# Patient Record
Sex: FEMALE | Race: BLACK OR AFRICAN AMERICAN | Employment: OTHER | URBAN - NONMETROPOLITAN AREA
[De-identification: names, ages, dates, MRNs, and addresses within clinical notes are randomized per-mention and may not be internally consistent; named-entity substitution may affect disease eponyms.]

---

## 2021-07-08 ENCOUNTER — HOSPITAL ENCOUNTER (INPATIENT)
Age: 46
LOS: 5 days | Discharge: HOME | DRG: 885 | End: 2021-07-13
Attending: EMERGENCY MEDICINE | Admitting: PSYCHIATRY & NEUROLOGY
Payer: MEDICAID

## 2021-07-08 DIAGNOSIS — F19.11 HISTORY OF DRUG ABUSE (HCC): ICD-10-CM

## 2021-07-08 DIAGNOSIS — M54.50 ACUTE EXACERBATION OF CHRONIC LOW BACK PAIN: ICD-10-CM

## 2021-07-08 DIAGNOSIS — R45.851 DEPRESSION WITH SUICIDAL IDEATION: Primary | ICD-10-CM

## 2021-07-08 DIAGNOSIS — G89.29 ACUTE EXACERBATION OF CHRONIC LOW BACK PAIN: ICD-10-CM

## 2021-07-08 DIAGNOSIS — F32.A DEPRESSION WITH SUICIDAL IDEATION: Primary | ICD-10-CM

## 2021-07-08 LAB
ACETAMINOPHEN LEVEL: <15 UG/ML
ALBUMIN SERPL-MCNC: 4.4 G/DL (ref 3.5–5.2)
ALP BLD-CCNC: 63 U/L (ref 35–104)
ALT SERPL-CCNC: 20 U/L (ref 5–33)
AMPHETAMINE SCREEN, URINE: NEGATIVE
ANION GAP SERPL CALCULATED.3IONS-SCNC: 9 MMOL/L (ref 7–19)
AST SERPL-CCNC: 23 U/L (ref 5–32)
BARBITURATE SCREEN URINE: NEGATIVE
BASOPHILS ABSOLUTE: 0.1 K/UL (ref 0–0.2)
BASOPHILS RELATIVE PERCENT: 0.5 % (ref 0–1)
BENZODIAZEPINE SCREEN, URINE: NEGATIVE
BILIRUB SERPL-MCNC: <0.2 MG/DL (ref 0.2–1.2)
BUN BLDV-MCNC: 12 MG/DL (ref 6–20)
CALCIUM SERPL-MCNC: 10.1 MG/DL (ref 8.6–10)
CANNABINOID SCREEN URINE: NEGATIVE
CHLORIDE BLD-SCNC: 98 MMOL/L (ref 98–111)
CO2: 28 MMOL/L (ref 22–29)
COCAINE METABOLITE SCREEN URINE: NEGATIVE
CREAT SERPL-MCNC: 0.6 MG/DL (ref 0.5–0.9)
EOSINOPHILS ABSOLUTE: 0.3 K/UL (ref 0–0.6)
EOSINOPHILS RELATIVE PERCENT: 3.5 % (ref 0–5)
ETHANOL: <10 MG/DL (ref 0–0.08)
GFR AFRICAN AMERICAN: >59
GFR NON-AFRICAN AMERICAN: >60
GLUCOSE BLD-MCNC: 121 MG/DL (ref 74–109)
HCG QUALITATIVE: NEGATIVE
HCT VFR BLD CALC: 35.3 % (ref 37–47)
HEMOGLOBIN: 11.5 G/DL (ref 12–16)
IMMATURE GRANULOCYTES #: 0 K/UL
LYMPHOCYTES ABSOLUTE: 2.4 K/UL (ref 1.1–4.5)
LYMPHOCYTES RELATIVE PERCENT: 24.9 % (ref 20–40)
Lab: NORMAL
MCH RBC QN AUTO: 22.6 PG (ref 27–31)
MCHC RBC AUTO-ENTMCNC: 32.6 G/DL (ref 33–37)
MCV RBC AUTO: 69.4 FL (ref 81–99)
MONOCYTES ABSOLUTE: 0.9 K/UL (ref 0–0.9)
MONOCYTES RELATIVE PERCENT: 8.8 % (ref 0–10)
NEUTROPHILS ABSOLUTE: 6.1 K/UL (ref 1.5–7.5)
NEUTROPHILS RELATIVE PERCENT: 62 % (ref 50–65)
OPIATE SCREEN URINE: NEGATIVE
PDW BLD-RTO: 15 % (ref 11.5–14.5)
PLATELET # BLD: 545 K/UL (ref 130–400)
PMV BLD AUTO: 8.9 FL (ref 9.4–12.3)
POTASSIUM SERPL-SCNC: 3.8 MMOL/L (ref 3.5–5)
RBC # BLD: 5.09 M/UL (ref 4.2–5.4)
SALICYLATE, SERUM: <3 MG/DL (ref 3–10)
SARS-COV-2, NAAT: NOT DETECTED
SODIUM BLD-SCNC: 135 MMOL/L (ref 136–145)
TOTAL PROTEIN: 7.8 G/DL (ref 6.6–8.7)
WBC # BLD: 9.8 K/UL (ref 4.8–10.8)

## 2021-07-08 PROCEDURE — 80143 DRUG ASSAY ACETAMINOPHEN: CPT

## 2021-07-08 PROCEDURE — 80307 DRUG TEST PRSMV CHEM ANLYZR: CPT

## 2021-07-08 PROCEDURE — 80179 DRUG ASSAY SALICYLATE: CPT

## 2021-07-08 PROCEDURE — 82077 ASSAY SPEC XCP UR&BREATH IA: CPT

## 2021-07-08 PROCEDURE — 84703 CHORIONIC GONADOTROPIN ASSAY: CPT

## 2021-07-08 PROCEDURE — 6370000000 HC RX 637 (ALT 250 FOR IP): Performed by: PSYCHIATRY & NEUROLOGY

## 2021-07-08 PROCEDURE — 36415 COLL VENOUS BLD VENIPUNCTURE: CPT

## 2021-07-08 PROCEDURE — 6370000000 HC RX 637 (ALT 250 FOR IP)

## 2021-07-08 PROCEDURE — 87635 SARS-COV-2 COVID-19 AMP PRB: CPT

## 2021-07-08 PROCEDURE — 96372 THER/PROPH/DIAG INJ SC/IM: CPT

## 2021-07-08 PROCEDURE — 85025 COMPLETE CBC W/AUTO DIFF WBC: CPT

## 2021-07-08 PROCEDURE — 99284 EMERGENCY DEPT VISIT MOD MDM: CPT

## 2021-07-08 PROCEDURE — 6360000002 HC RX W HCPCS: Performed by: EMERGENCY MEDICINE

## 2021-07-08 PROCEDURE — 80053 COMPREHEN METABOLIC PANEL: CPT

## 2021-07-08 PROCEDURE — 1240000000 HC EMOTIONAL WELLNESS R&B

## 2021-07-08 RX ORDER — TRAZODONE HYDROCHLORIDE 50 MG/1
50 TABLET ORAL NIGHTLY PRN
Status: DISCONTINUED | OUTPATIENT
Start: 2021-07-08 | End: 2021-07-13 | Stop reason: HOSPADM

## 2021-07-08 RX ORDER — POLYETHYLENE GLYCOL 3350 17 G/17G
17 POWDER, FOR SOLUTION ORAL DAILY PRN
Status: DISCONTINUED | OUTPATIENT
Start: 2021-07-08 | End: 2021-07-13 | Stop reason: HOSPADM

## 2021-07-08 RX ORDER — CHOLECALCIFEROL (VITAMIN D3) 125 MCG
5 CAPSULE ORAL NIGHTLY PRN
Status: DISCONTINUED | OUTPATIENT
Start: 2021-07-08 | End: 2021-07-08 | Stop reason: SDUPTHER

## 2021-07-08 RX ORDER — ORPHENADRINE CITRATE 30 MG/ML
60 INJECTION INTRAMUSCULAR; INTRAVENOUS ONCE
Status: COMPLETED | OUTPATIENT
Start: 2021-07-08 | End: 2021-07-08

## 2021-07-08 RX ORDER — MECOBALAMIN 5000 MCG
5 TABLET,DISINTEGRATING ORAL NIGHTLY PRN
Status: DISCONTINUED | OUTPATIENT
Start: 2021-07-08 | End: 2021-07-13 | Stop reason: HOSPADM

## 2021-07-08 RX ORDER — ACETAMINOPHEN 325 MG/1
650 TABLET ORAL EVERY 4 HOURS PRN
Status: DISCONTINUED | OUTPATIENT
Start: 2021-07-08 | End: 2021-07-13 | Stop reason: HOSPADM

## 2021-07-08 RX ORDER — ROPINIROLE 1 MG/1
1 TABLET, FILM COATED ORAL NIGHTLY
COMMUNITY

## 2021-07-08 RX ORDER — HYDROCHLOROTHIAZIDE 25 MG/1
25 TABLET ORAL DAILY
COMMUNITY

## 2021-07-08 RX ORDER — ALBUTEROL SULFATE 90 UG/1
2 AEROSOL, METERED RESPIRATORY (INHALATION) EVERY 6 HOURS PRN
COMMUNITY

## 2021-07-08 RX ORDER — KETOROLAC TROMETHAMINE 30 MG/ML
30 INJECTION, SOLUTION INTRAMUSCULAR; INTRAVENOUS ONCE
Status: COMPLETED | OUTPATIENT
Start: 2021-07-08 | End: 2021-07-08

## 2021-07-08 RX ORDER — OMEPRAZOLE 20 MG/1
20 CAPSULE, DELAYED RELEASE ORAL DAILY
COMMUNITY

## 2021-07-08 RX ORDER — AMLODIPINE BESYLATE 10 MG/1
10 TABLET ORAL DAILY
COMMUNITY

## 2021-07-08 RX ORDER — HYDROXYZINE HYDROCHLORIDE 25 MG/1
25 TABLET, FILM COATED ORAL 3 TIMES DAILY PRN
Status: DISCONTINUED | OUTPATIENT
Start: 2021-07-08 | End: 2021-07-09

## 2021-07-08 RX ORDER — ACYCLOVIR 400 MG/1
400 TABLET ORAL 2 TIMES DAILY
COMMUNITY

## 2021-07-08 RX ORDER — RIZATRIPTAN BENZOATE 10 MG/1
10 TABLET ORAL
COMMUNITY

## 2021-07-08 RX ORDER — HYDROXYZINE HYDROCHLORIDE 25 MG/1
25 TABLET, FILM COATED ORAL EVERY 4 HOURS PRN
COMMUNITY

## 2021-07-08 RX ORDER — METOPROLOL SUCCINATE 25 MG/1
75 TABLET, EXTENDED RELEASE ORAL DAILY
COMMUNITY

## 2021-07-08 RX ORDER — LOSARTAN POTASSIUM 100 MG/1
100 TABLET ORAL DAILY
COMMUNITY

## 2021-07-08 RX ADMIN — Medication 5 MG: at 21:20

## 2021-07-08 RX ADMIN — ORPHENADRINE CITRATE 60 MG: 30 INJECTION INTRAMUSCULAR; INTRAVENOUS at 10:09

## 2021-07-08 RX ADMIN — HYDROXYZINE HYDROCHLORIDE 25 MG: 25 TABLET, FILM COATED ORAL at 21:19

## 2021-07-08 RX ADMIN — KETOROLAC TROMETHAMINE 30 MG: 30 INJECTION, SOLUTION INTRAMUSCULAR at 10:08

## 2021-07-08 RX ADMIN — ACETAMINOPHEN 650 MG: 325 TABLET ORAL at 21:28

## 2021-07-08 RX ADMIN — ACETAMINOPHEN 650 MG: 325 TABLET ORAL at 18:12

## 2021-07-08 ASSESSMENT — ENCOUNTER SYMPTOMS
SHORTNESS OF BREATH: 0
VOMITING: 0
DIARRHEA: 0
BACK PAIN: 1
COUGH: 0
ABDOMINAL PAIN: 0
NAUSEA: 0

## 2021-07-08 ASSESSMENT — PAIN DESCRIPTION - ONSET: ONSET: GRADUAL

## 2021-07-08 ASSESSMENT — PAIN DESCRIPTION - LOCATION
LOCATION: BACK;LEG
LOCATION: LEG

## 2021-07-08 ASSESSMENT — LIFESTYLE VARIABLES: HISTORY_ALCOHOL_USE: YES

## 2021-07-08 ASSESSMENT — PAIN SCALES - GENERAL
PAINLEVEL_OUTOF10: 6
PAINLEVEL_OUTOF10: 7
PAINLEVEL_OUTOF10: 7
PAINLEVEL_OUTOF10: 8

## 2021-07-08 ASSESSMENT — PAIN DESCRIPTION - DESCRIPTORS
DESCRIPTORS: PATIENT UNABLE TO DESCRIBE
DESCRIPTORS: ACHING;BURNING

## 2021-07-08 ASSESSMENT — PAIN DESCRIPTION - PAIN TYPE: TYPE: CHRONIC PAIN

## 2021-07-08 ASSESSMENT — PAIN DESCRIPTION - PROGRESSION: CLINICAL_PROGRESSION: NOT CHANGED

## 2021-07-08 ASSESSMENT — PAIN DESCRIPTION - ORIENTATION
ORIENTATION: OTHER (COMMENT)
ORIENTATION: LEFT;RIGHT

## 2021-07-08 ASSESSMENT — PAIN - FUNCTIONAL ASSESSMENT: PAIN_FUNCTIONAL_ASSESSMENT: ACTIVITIES ARE NOT PREVENTED

## 2021-07-08 ASSESSMENT — SLEEP AND FATIGUE QUESTIONNAIRES: AVERAGE NUMBER OF SLEEP HOURS: 6

## 2021-07-08 ASSESSMENT — PATIENT HEALTH QUESTIONNAIRE - PHQ9: SUM OF ALL RESPONSES TO PHQ QUESTIONS 1-9: 11

## 2021-07-08 ASSESSMENT — PAIN DESCRIPTION - FREQUENCY: FREQUENCY: INTERMITTENT

## 2021-07-08 NOTE — PROGRESS NOTES
[facility] Admission From ED  Nursing Admission Note    Admission Type: Voluntary         There is no problem list on file for this patient. Pt admitted from Dr. Kimmie adkins in ED to ADULT [facility] room 0605/605-01. Arrived on unit via [address] [address] with staff and [address] [place] [address] escort. Pt attired in paper scrub bottoms and hospital gown. Appropriate clothing given to pt on arrival to unit. Body assessment completed by Meron RN and Kyle RN with no contraband discovered. All tubes, lines, and drains were appropriately discontinued by ED staff prior to pt transfer to [facility]. Pt belongings and valuables inventoried and cataloged, stored per policy. Pt oriented to surroundings, program expectations, and copy pt rights given. Received admit packet: [facility], Visitation Info, Fall Prevention, Restraints Info. Consents reviewed, signed Pt Rights, Handbook Acceptance, Visit/Call Acceptance, PHI Release, Social Info Release, and Treatment Agreement. Pt verbalizes understanding. Pt is a smoker? yes, Pt offered Nicotine patch yes,  Pt refused Nicotine patch? yes, states she is allergic Patient provided education on Covid-19 and the importance of reporting any respiratory symptoms, headache, body aches or cough to the nursing staff as well as  frequent hand washing, social distancing and wearing a mask while on the unit? yes, patient provided mask? yes, patient refused mask? yes, Patient verbalized understanding? yes    Identifies stressors. Gave up everything to go to rehab, no place to live, bills.     Status and Exam  Normal: No  Facial Expression: Flat  Affect: Blunt  Level of Consciousness: Alert  Mood:Normal: No  Mood: Depressed, Anxious, Irritable  Motor Activity:Normal: No  Motor Activity: Decreased  Interview Behavior: Cooperative  Preception: [place] to Person, Jocelin Mention to Time, [place] to Place, [place] to Situation  Attention:Normal: Yes  Thought Processes: Circumstantial  Thought Content:Normal: Yes  Hallucinations: None  Delusions: No  Memory:Normal: No  Memory: Poor Remote  Insight and Judgment: No  Insight and Judgment: Poor Judgment, Poor Insight  Present Suicidal Ideation: No  Present Homicidal Ideation: No    C/o:    Notes:

## 2021-07-08 NOTE — CARE COORDINATION
Pt calling numbers of rehab and homeless provided.  Electronically signed by Navin Coyle on 7/8/2021 at 12:06 PM

## 2021-07-08 NOTE — ED NOTES
Patient states that she's now suicidal and states she's either going to  \"do drugs or kill myself\". Patient eating lunch quietly at this time. No distress noted.         Nerissa Hooks RN  07/08/21 7864
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
## 2021-07-08 NOTE — CARE COORDINATION
Pt unable to go back to [facility], pt states she has been off her meds for a week and believes this is the cause for the outbursts, that she feels like something is wrong. Pt is homeless with no transportation. Psych has denied. Main needs are homeless, psych meds and monitoring, drug rehab, and back pain.  Electronically signed by Kedar Mora on 7/8/2021 at 10:56 AM

## 2021-07-08 NOTE — CARE COORDINATION
Psych requested pt call [facility] and changes rehab. She states what does that have to do with my mental health, I want to go kill myself and they want to send me to rehab. Stated she will call per request but that mental health and suicide is her main concern that she is hopeless alone and defeated.  Electronically signed by Richard Ford on 7/8/2021 at 2:22 PM

## 2021-07-08 NOTE — PROGRESS NOTES
Pt at desk and asked this nurse \"are yall going to be bad to me? \"  Soon after pt began demanding that staff let her go through her belongings to pick through her clothes to decide what to use while here and Clothes to donate. \"  She was instructed that there were 4 large bags of clothing and she would not be able to sort through belongings. She demanded that she have a wheelchair because she \"could not walk\" but pt appears to be walking with out difficulties. She stands at desk repeating that she wants sandals and would not go to room for safety check because she wasn't going to walk with hospital socks on in \"the dirty ass floor. \"  Her sandals were found and given to her. Once in her room she refused to change because she wanted her bra. She did eventually change into her clothes from her belongings and then her bra was found and given to her. She continued in this manner for the first hour and half on the unit. She eventually apologized stating \"im sorry I was giving you a  hard time. \"  She soon continued her behaviors with staff.
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
## 2021-07-09 PROBLEM — F34.9 PERSISTENT MOOD (AFFECTIVE) DISORDER, UNSPECIFIED (HCC): Status: ACTIVE | Noted: 2021-07-09

## 2021-07-09 PROCEDURE — 6370000000 HC RX 637 (ALT 250 FOR IP): Performed by: PSYCHIATRY & NEUROLOGY

## 2021-07-09 PROCEDURE — 1240000000 HC EMOTIONAL WELLNESS R&B

## 2021-07-09 PROCEDURE — 99221 1ST HOSP IP/OBS SF/LOW 40: CPT | Performed by: PSYCHIATRY & NEUROLOGY

## 2021-07-09 RX ORDER — ALBUTEROL SULFATE 90 UG/1
2 AEROSOL, METERED RESPIRATORY (INHALATION) EVERY 6 HOURS PRN
Status: DISCONTINUED | OUTPATIENT
Start: 2021-07-09 | End: 2021-07-09 | Stop reason: SDUPTHER

## 2021-07-09 RX ORDER — HYDROCHLOROTHIAZIDE 25 MG/1
25 TABLET ORAL DAILY
Status: DISCONTINUED | OUTPATIENT
Start: 2021-07-09 | End: 2021-07-13 | Stop reason: HOSPADM

## 2021-07-09 RX ORDER — ALBUTEROL SULFATE 2.5 MG/3ML
2.5 SOLUTION RESPIRATORY (INHALATION) EVERY 6 HOURS PRN
Status: DISCONTINUED | OUTPATIENT
Start: 2021-07-09 | End: 2021-07-13 | Stop reason: HOSPADM

## 2021-07-09 RX ORDER — PANTOPRAZOLE SODIUM 40 MG/1
40 TABLET, DELAYED RELEASE ORAL
Status: DISCONTINUED | OUTPATIENT
Start: 2021-07-10 | End: 2021-07-13 | Stop reason: HOSPADM

## 2021-07-09 RX ORDER — MELOXICAM 7.5 MG/1
7.5 TABLET ORAL DAILY
Status: DISCONTINUED | OUTPATIENT
Start: 2021-07-09 | End: 2021-07-13 | Stop reason: HOSPADM

## 2021-07-09 RX ORDER — ASENAPINE MALEATE 2.5 MG/1
2.5 TABLET SUBLINGUAL 2 TIMES DAILY
Status: DISCONTINUED | OUTPATIENT
Start: 2021-07-09 | End: 2021-07-10

## 2021-07-09 RX ORDER — HYDROXYZINE HYDROCHLORIDE 25 MG/1
25 TABLET, FILM COATED ORAL 3 TIMES DAILY PRN
Status: DISCONTINUED | OUTPATIENT
Start: 2021-07-09 | End: 2021-07-13 | Stop reason: HOSPADM

## 2021-07-09 RX ORDER — METOPROLOL SUCCINATE 25 MG/1
25 TABLET, EXTENDED RELEASE ORAL DAILY
Status: DISCONTINUED | OUTPATIENT
Start: 2021-07-09 | End: 2021-07-09 | Stop reason: CLARIF

## 2021-07-09 RX ORDER — AMLODIPINE BESYLATE 10 MG/1
10 TABLET ORAL DAILY
Status: DISCONTINUED | OUTPATIENT
Start: 2021-07-09 | End: 2021-07-13 | Stop reason: HOSPADM

## 2021-07-09 RX ORDER — LOSARTAN POTASSIUM 100 MG/1
100 TABLET ORAL DAILY
Status: DISCONTINUED | OUTPATIENT
Start: 2021-07-09 | End: 2021-07-13 | Stop reason: HOSPADM

## 2021-07-09 RX ADMIN — HYDROCHLOROTHIAZIDE 25 MG: 25 TABLET ORAL at 09:44

## 2021-07-09 RX ADMIN — NICOTINE POLACRILEX 2 MG: 2 GUM, CHEWING BUCCAL at 20:20

## 2021-07-09 RX ADMIN — AMLODIPINE BESYLATE 10 MG: 10 TABLET ORAL at 09:44

## 2021-07-09 RX ADMIN — LOSARTAN POTASSIUM 100 MG: 100 TABLET, FILM COATED ORAL at 09:44

## 2021-07-09 RX ADMIN — MELOXICAM 7.5 MG: 7.5 TABLET ORAL at 15:50

## 2021-07-09 RX ADMIN — ASENAPINE MALEATE 2.5 MG: 2.5 TABLET SUBLINGUAL at 21:44

## 2021-07-09 RX ADMIN — ASENAPINE MALEATE 2.5 MG: 2.5 TABLET SUBLINGUAL at 12:19

## 2021-07-09 RX ADMIN — ACETAMINOPHEN 650 MG: 325 TABLET ORAL at 09:11

## 2021-07-09 RX ADMIN — METOPROLOL SUCCINATE 75 MG: 50 TABLET, EXTENDED RELEASE ORAL at 12:19

## 2021-07-09 ASSESSMENT — PAIN SCALES - GENERAL
PAINLEVEL_OUTOF10: 4
PAINLEVEL_OUTOF10: 0
PAINLEVEL_OUTOF10: 7

## 2021-07-09 ASSESSMENT — PAIN - FUNCTIONAL ASSESSMENT: PAIN_FUNCTIONAL_ASSESSMENT: 0-10

## 2021-07-09 NOTE — PROGRESS NOTES
Treatment Team Note:    LCSW met with [place] team to discuss Pts [address] plans. Progress/Behavior/Group Attendance: TBD    Target Symptoms/Reason for admission: suicidal ideation, depression    Diagnoses: depression, suicidal ideation    UDS: Neg     BAL: Neg    AftercarePlan: [address]    Pt lives with: SW will meet with pt to gather information. Collateral obtained from: SW will meet with pt to gather release of information.   On:    Family Session: ZENAIDA    Misc:

## 2021-07-09 NOTE — PROGRESS NOTES
Progress Note  Jennie Joshua  7/9/2021 8:22 AM  Subjective:   Admit Date:   7/8/2021      CC/ADMIT DX:       Interval History:   Reviewed overnight events and nursing notes. She has no new medical issues. I have reviewed all labs/diagnostics from the last 24hrs. ROS:   I have done a 10 point ROS and all are negative, except what is mentioned in the HPI. ADULT DIET; Regular    Medications:             Objective:   Vitals: /77   Pulse 74   Temp 97 °F (36.1 °C) (Temporal)   Resp 18   Ht 5' 4\" (1.626 m)   Wt 197 lb (89.4 kg)   LMP 06/08/2021   SpO2 98%   BMI 33.81 kg/m²  No intake or output data in the 24 hours ending 07/09/21 1528  General appearance: alert and cooperative with exam  Extremities: extremities normal, atraumatic, no cyanosis or edema  Neurologic:  No obvious focal neurologic deficits. Skin: no rashes    Assessment and Plan:   Depression    Plan:  1. Continue present medication(s)   2. She is medically stable. I will monitor for any changes or concerns. 3.  Follow with Psych      Discharge planning:   her home     Reviewed treatment plans with the patient and/or family.              Electronically signed by Aleah Maharaj MD on 7/9/2021 at 8:22 AM
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
## 2021-07-09 NOTE — PROGRESS NOTES
[facility] Daily Shift Assessment  Nursing Progress Note    Room: 0605/605-01 Name: Janny Hu Age: 39 y.o. Gender: female   Dx: <principal problem not specified>  Precautions: suicide risk  Target Symptoms:   Accu-Chek: NoSleep: Yes,Sleep Quality Good SI No AVH Denies   [address] [place] [address]  ADLs: No Speech: normal Depression: 6 Anxiety: 6   Participation LevelActive Listener  Appetite: Good  Respiratory symptoms: No Headache: No Body aches: No Fever: No Cough: No  Patients encouraged to wear masks, wash hands frequently and practice social distancing while on the unit: Yes  Visitation: No   Participation QualityAppropriate and Attentive    Complaints:    Notes: Patient spending most of the morning in bed, although she did come out briefly to watch television when the other patients are sleeping. Patient pleasant and calm. Said that even though she is still tired, she had a better sleep last night than she had at any of the other places that she has been staying lately. Patient says that her depression is at a 6 and her anxiety is a 6. Patient says that it is very hard to sleep at night because of racing thoughts, worrying about where she is going after here. Patient states that she is homeless and without any friends in the area, she says this is contributing to the majority of her depression. She says that she needs a place to stay and doesn't have any money for this but can't operate until she gets that taken care of first.  Patient denies SI, HI, and AVH.       Signature:

## 2021-07-09 NOTE — PROGRESS NOTES
Group Therapy Note    Start Time: 1000  End Time:  9902  Number of Participants: 12    Type of Group: Community Meeting       Patient's Goal:  Resting my mind and slowing my thought process      Notes:      Participation Level:  Active Listener       Participation Quality: Appropriate      Thought Process/Content: Logical      Affective Functioning: Congruent      Mood: calm      Level of consciousness:  Alert      Modes of Intervention: Support      Discipline Responsible: Behavioral Health Tech II      Signature:  Melvin

## 2021-07-09 NOTE — PROGRESS NOTES
Patient became very loud, angry and irritated very fast during her medication pass. Patient began cursing at staff, saying that she wanted a grievance form, and demanding that staff go get her pants out of her clothes for her to sleep in. Patient began saying loudly that she could show her butt in here and pulled her shorts down and back up stating she wasn't afraid that she had been in custodial before. Staff called security to the floor while the other nurse offered the patient some scrub pants. Patient calmed down immediately. Patient agreeable to scrub pants but when checked neither unit had pants to fit the patient. Patient took her night time medications and was asked to wait in her room while staff looked for her some clothing to put on. Patient calmly returned to her room. Staff found patient a pair of pajamas to wear and gave them to the patient. At that time patient began to talk with staff and appoligized for her behavior stating that she does not know why she does things like that, it is embarrassing but that lately she can not control her anger and that is why she is here. Patient is remorseful with staff. Patient reports having gone to custodial over anger issues in the past but states that they are worse now for some reason. Patient states  That she is afraid to try and go to rehab with these angry outburst , that she will get kicked out. Patient reports feeling like she takes 5 steps forward and 10 steps back all the time and can't seem to get ahead in any direction. Patient reassured and calmed at this time will monitor.

## 2021-07-09 NOTE — H&P
HISTORY and PHYSICAL      CHIEF COMPLAINT:  Depression    Reason for Admission:  Depression    History Obtained From:  Patient, chart    HISTORY OF PRESENT ILLNESS:      The patient is a 39 y.o. female who is admitted to the [place] [address] unit with worsening mood issues. She has no c/o CP or SOA. No abdominal pain or N/V. No dysuria. She has c/o chronic back pain. No fevers. Past Medical History:        Diagnosis Date    B[facility] disorder ([facility]     Borderline personality disorder ([facility]     COPD (chronic obstructive pulmonary disease) ([facility]     Hypertension     PTSD (post-traumatic stress disorder)      Past Surgical History:        Procedure Laterality Date    LEEP           Medications Prior to Admission:    Medications Prior to Admission: hydroCHLOROthiazide (HYDRODIURIL) 25 MG tablet, Take 25 mg by mouth daily   losartan (COZAAR) 100 MG tablet, Take 100 mg by mouth daily  amLODIPine (NORVASC) 10 MG tablet, Take 10 mg by mouth daily  metoprolol succinate (TOPROL XL) 25 MG extended release tablet, Take 75 mg by mouth daily  albuterol sulfate HFA (VENTOLIN HFA) 108 (90 Base) MCG/ACT inhaler, Inhale 2 puffs into the lungs every 6 hours as needed for Wheezing  rizatriptan (MAXALT) 10 MG tablet, Take 10 mg by mouth once as needed for Migraine May repeat in 2 hours if needed  acyclovir (ZOVIRAX) 400 MG tablet, Take 400 mg by mouth 2 times daily  rOPINIRole (REQUIP) 1 MG tablet, Take 1 mg by mouth nightly  omeprazole (PRILOSEC) 20 MG delayed release capsule, Take 20 mg by mouth daily  hydrOXYzine (ATARAX) 25 MG tablet, Take 25 mg by mouth every 4 hours as needed for Itching or Anxiety    Allergies:  Patient has no known allergies. Social History:   TOBACCO:   reports that she has been smoking cigarettes. She has been smoking about 1.00 pack per day. She has never used smokeless tobacco.  ETOH:   reports current alcohol use.   DRUGS:   reports current drug use. Drugs: Cocaine and Marijuana. MARITAL STATUS:  single  OCCUPATION:  On disability  Patient currently lives in a Rehab      Family History:   History reviewed. No pertinent family history. REVIEW OF SYSTEMS:  Constitutional: neg  CV: neg  Pulmonary: neg  GI: neg  : neg  Psych: depression  Neuro: neg  Skin: neg  MusculoSkeletal: chronic back pain  HEENT: neg  Joints: neg    Vitals:  /77   Pulse 74   Temp 97 °F (36.1 °C) (Temporal)   Resp 18   Ht 5' 4\" (1.626 m)   Wt 197 lb (89.4 kg)   LMP 06/08/2021   SpO2 98%   BMI 33.81 kg/m²     PHYSICAL EXAM:  Gen: NAD, alert  HEENT: WNL  Lymph: no LAD  Neck: no JVD or masses  Chest: CTA bilat  CV: RRR  Abdomen: NT/ND  Extrem: no C/C/E  Neuro: non focal  Skin: no rashes  Joints: no redness    DATA:  I have reviewed the admission labs and imaging tests.     ASSESSMENT AND PLAN:      Active Problems:   Depression---follow with Psych   HTN---follow with BP   COPD--no issues    Chronic Back  Pain---supportive care    Anemia    Hyperglycemia--check HgA1C        Duong Mercedes MD  2:09 AM 7/9/2021

## 2021-07-09 NOTE — PROGRESS NOTES
[facility] Adult Unit Daily Assessment  Nursing Progress Note    Room: [facility]/605-01   Name: Romel Sommer   Age: 39 y.o. Gender: female   Dx: <principal problem not specified>  Precautions: suicide risk  Inpatient Status: voluntary       SLEEP:    Sleep Quality Fair  Sleep Medications: No   PRN Sleep Meds: Yes       MEDICAL:    Other PRN Meds: Yes   Med Compliant: Yes  Accu-Chek: No  Oxygen/CPAP/BiPAP: No  CIWA/CINA: No   PAIN Assessment: none  Side Effects from medication: Yes and patient refused the trazodone stating that it makes her restless legs worse    Is Patient experiencing any respiratory symptoms (headache, fever, body aches, cough. Jhonny Founds ): no  Patient educated by nursing to practice social distancing, wear masks, wash hands frequently: yes      PSYCH:    Depression: 9   Anxiety: 7   SI denies suicidal ideation   HI Negative for homicidal ideation      AVH:Absent      GENERAL:    Appetite: no change from normal    Social: Yes   Speech: normal, pressured and loud   Appearance: appropriately dressed, appropriately groomed and healthy looking    GROUP:    Group Participation: Yes  Participation Quality: Interactive    Notes:     Patient is alert, oriented, calm and cooperative with staff and peers at the beginning of the shift. Patient was in the tv area socializing with peers. Patient did ask both nursing staff to go get her some pants out of her belongings after day shift had already given her clothes and told her she could not have any more. Thi nurse told her I would look for her something else to wear when I had time. Patient was wearing track pants at this time. Patient was agreeable to this. Patient had fair eye contact during interview. Patient did c/o being paranoid about sleeping in the dark during interview.   When patient came to the nurses station to get her medications patient became very loud, mad and angry with staff demanding her pants to sleep in stating that her legs were cold and it made her legs hurt worse. Patient was told once again that staff would look for her something to wear when given the chance and suggested that he change out of shorts and put the pants she had on earlier back on to help keep her legs warmer. patient became agitated and madder at staff as evidenced by the patient raising her voice threatening and cursing at staff. Security was called to the unit for presence and patient calmed down immediately. Patient returned to the desk took her medications then went to wait in her room. Patient later appoligized to staff and talked more openly to staff about her anger issues and needing help with then and needing rehab but being afraid to try to go to rehab with these anger issues. Patient also spoke more about being paranoid/scaared to sleep in the dark. Patient reassured that staff would do 15 minute checks on her all night long and she could sleep with her light on. No obvious s/s of distress noted at this time. Will continue to monitor.        Electronically signed by Vaughn Marks RN on 7/9/21 at 1:22 AM CDT
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
## 2021-07-09 NOTE — PROGRESS NOTES
Admission Note      Reason for admission/Target Symptom: Patient admitted to [facility] due to   Diagnoses: depression  UDS: neg  BAL:  neg    SW met with treatment team to discuss patient's treatment including care planning, discharge planning, and follow-up needs. Pt has been admitted to [facility]. Treatment team has identified patient's discharge needs as medication management and outpatient therapy/counseling. Pt confirmed  the need for ongoing treatment post inpatient stay. Pt was also provided a handout of contact information for drug and alcohol treatment centers and other community support service such as [facility], AA, and [facility].

## 2021-07-09 NOTE — PLAN OF CARE
Group Therapy Note     Date: 7/9/2021  Start Time: 8849  End Time:  1600  Number of Participants: 7     Type of Group: Psychotherapy     Wellness Binder Information  Module Name:  emotional wellness  Session Number:  5     Patient's Goal:  obstacles to emotional wellness     Notes:  pt was verbally prompted to attend group. Pt refused. Information about obstacles to wellness was provided. Status After Intervention:       Participation Level:      Participation Quality:         Speech:           Thought Process/Content:         Affective Functioning:         Mood:         Level of consciousness:          Response to Learning:         Endings:      Modes of Intervention:         Discipline Responsible: Psychoeducational Specialist           Signature:  Wellington Akhtar

## 2021-07-10 PROCEDURE — 6370000000 HC RX 637 (ALT 250 FOR IP): Performed by: PSYCHIATRY & NEUROLOGY

## 2021-07-10 PROCEDURE — 99231 SBSQ HOSP IP/OBS SF/LOW 25: CPT | Performed by: PSYCHIATRY & NEUROLOGY

## 2021-07-10 PROCEDURE — 1240000000 HC EMOTIONAL WELLNESS R&B

## 2021-07-10 RX ORDER — ASENAPINE MALEATE 2.5 MG/1
2.5 TABLET SUBLINGUAL NIGHTLY
Status: DISCONTINUED | OUTPATIENT
Start: 2021-07-10 | End: 2021-07-13 | Stop reason: HOSPADM

## 2021-07-10 RX ADMIN — LOSARTAN POTASSIUM 100 MG: 100 TABLET, FILM COATED ORAL at 08:16

## 2021-07-10 RX ADMIN — ASENAPINE MALEATE 2.5 MG: 2.5 TABLET SUBLINGUAL at 20:22

## 2021-07-10 RX ADMIN — METOPROLOL SUCCINATE 75 MG: 50 TABLET, EXTENDED RELEASE ORAL at 08:15

## 2021-07-10 RX ADMIN — MELOXICAM 7.5 MG: 7.5 TABLET ORAL at 08:16

## 2021-07-10 RX ADMIN — HYDROCHLOROTHIAZIDE 25 MG: 25 TABLET ORAL at 08:16

## 2021-07-10 RX ADMIN — NICOTINE POLACRILEX 2 MG: 2 GUM, CHEWING BUCCAL at 15:30

## 2021-07-10 RX ADMIN — PANTOPRAZOLE SODIUM 40 MG: 40 TABLET, DELAYED RELEASE ORAL at 08:16

## 2021-07-10 RX ADMIN — AMLODIPINE BESYLATE 10 MG: 10 TABLET ORAL at 08:16

## 2021-07-10 ASSESSMENT — PAIN SCALES - GENERAL: PAINLEVEL_OUTOF10: 4

## 2021-07-10 NOTE — PROGRESS NOTES
Group Note    Number of Participants in Group: 13  Number of Patients on Unit:13      Patient attended group:Yes  Reason for Absence:  Intervention for patient absence:        Type of Group:   Wrap-Up/Relaxation    Patient's Goal: See wrap up group sheet    Participation Level:    Minimal           Patient Response to Learning: No    Patient's Behavior: Cooperative and Pleasant    Is Patient Social/Interacting: Yes    Relaxation:   Television:Yes   Reading:No   Game/Puzzle:No   Phone: No       Notes/Comments:    Patient filled out wrap up sheet     Please see patient's wrap up group sheet for patient's comments       Electronically signed by Edward Yen RN on 7/10/21 at 1:15 AM CDT

## 2021-07-10 NOTE — PROGRESS NOTES
[facility] Adult Unit Daily Assessment  Nursing Progress Note    Room: [facility]/605-01   Name: Zach Florez   Age: 39 y.o. Gender: female   Dx: <principal problem not specified>  Precautions: suicide risk  Inpatient Status: voluntary       SLEEP:    Sleep Quality good  Sleep Medications: No   PRN Sleep Meds: no      MEDICAL:    Other PRN Meds: Yes   Med Compliant: Yes  Accu-Chek: No  Oxygen/CPAP/BiPAP: No  CIWA/CINA: No   PAIN Assessment: none  Side Effects from medication: Yes, patient refused the trazodone stating that it makes her restless legs worse patient reports that the saphris makes her very sleepy    Is Patient experiencing any respiratory symptoms (headache, fever, body aches, cough. Genesis Hendricks ): no  Patient educated by nursing to practice social distancing, wear masks, wash hands frequently: yes      PSYCH:    Depression: 6  Anxiety: 4  SI denies suicidal ideation   HI Negative for homicidal ideation      AVH:Absent      GENERAL:    Appetite: no change from normal    Social: Yes   Speech: normal  Appearance: appropriately dressed, appropriately groomed and healthy looking    GROUP:    Group Participation: Yes  Participation Quality: minimal    Notes:     Patient is alert, oriented, calm and cooperative with staff and peers. Patient was in the tv area socializing with peers this evening. Patient has good eye contact during interview. Patient reports having a better day but states she has slept a lot today due to her saphris. patient continues to refuse to take the trazodone for sleep. Patient reports that she did take a shower today,has a good appetite and slept good. Patient reports not going to groups today. No reports of  paranoia this evening. No obvious s/s of distress noted at this time. Will continue to monitor.        Electronically signed by Erika Willett RN on 7/9/21 at 11:12 PM CDT

## 2021-07-10 NOTE — PLAN OF CARE
Problem: Suicide risk  Goal: Provide patient with safe environment  Description: Provide patient with safe environment  Outcome: Ongoing     Problem: Pain:  Goal: Pain level will decrease  Description: Pain level will decrease  Outcome: Ongoing  Goal: Control of acute pain  Description: Control of acute pain  Outcome: Ongoing  Goal: Control of chronic pain  Description: Control of chronic pain  Outcome: Ongoing     Problem: Anger Management/Homicidal Ideation:  Goal: Able to display appropriate communication and problem solving  Description: Able to display appropriate communication and problem solving  Outcome: Ongoing  Goal: Ability to verbalize frustrations and anger appropriately will improve  Description: Ability to verbalize frustrations and anger appropriately will improve  Outcome: Ongoing  Goal: Absence of angry outbursts  Description: Absence of angry outbursts  Outcome: Ongoing  Goal: Absence of homicidal ideation  Description: Absence of homicidal ideation  Outcome: Ongoing  Goal: Participates in care planning  Description: Participates in care planning  Outcome: Ongoing  Goal: Patient specific goal  Description: Patient specific goal  Outcome: Ongoing     Problem: Depressive Behavior With or Without Suicide Precautions:  Goal: Able to verbalize acceptance of life and situations over which he or she has no control  Description: Able to verbalize acceptance of life and situations over which he or she has no control  Outcome: Ongoing  Goal: Able to verbalize and/or display a decrease in depressive symptoms  Description: Able to verbalize and/or display a decrease in depressive symptoms  Outcome: Ongoing  Goal: Ability to disclose and discuss suicidal ideas will improve  Description: Ability to disclose and discuss suicidal ideas will improve  Outcome: Ongoing  Goal: Able to verbalize support systems  Description: Able to verbalize support systems  Outcome: Ongoing  Goal: Absence of

## 2021-07-10 NOTE — PROGRESS NOTES
Department of Psychiatry  Attending Progress Note     Chief complaint: \"Worried\"    SUBJECTIVE:   Chart reviewed, discussed with the team.  No major issues reported. Patient was seen by the nursing desk this morning. States Saphris makes her too sedated, however, would like to continue taking it at night. Mood somewhat better, however, worries a lot about what will happen after discharge. We processed her feelings. Supportive psychotherapy provided. No physical complaints today. OBJECTIVE    Physical  Wt Readings from Last 3 Encounters:   07/08/21 197 lb (89.4 kg)     Temp Readings from Last 3 Encounters:   07/10/21 96.6 °F (35.9 °C) (Temporal)     BP Readings from Last 3 Encounters:   07/10/21 (!) 144/85     Pulse Readings from Last 3 Encounters:   07/10/21 86        Review of Systems: 14-point review of systems negative except as described above    Mental Status Examination:   Appearance:  Stated age. Gait stable. No abnormal movements or tremor. Behavior: Calm, cooperative, smiled  Speech: Normal in tone, volume, and quality. No slurring, dysarthria or pressured speech noted. Mood: \"Worried \"   Affect: Mood congruent. Thought Process: Appears linear. Thought Content:  Denies SI/HI. No overt delusions or paranoia appreciated. Perceptions: Denies auditory or visual hallucinations at present time. Not responding to internal stimuli. Concentration: Intact. Orientation: to person, place, date, and situation. Language: Intact. Fund of information: Intact. Memory: Recent and remote appear intact. Neurovegitative: Fair appetite and sleep. Insight: Improving. Judgment: Improving.     Data  Lab Results   Component Value Date    WBC 9.8 07/08/2021    HGB 11.5 (L) 07/08/2021    HCT 35.3 (L) 07/08/2021    MCV 69.4 (L) 07/08/2021     (H) 07/08/2021      Lab Results   Component Value Date     (L) 07/08/2021    K 3.8 07/08/2021    CL 98 07/08/2021    CO2 28 07/08/2021    BUN 12 07/08/2021    CREATININE 0.6 07/08/2021    GLUCOSE 121 (H) 07/08/2021    CALCIUM 10.1 (H) 07/08/2021    PROT 7.8 07/08/2021    LABALBU 4.4 07/08/2021    BILITOT <0.2 07/08/2021    ALKPHOS 63 07/08/2021    AST 23 07/08/2021    ALT 20 07/08/2021    LABGLOM >60 07/08/2021    GFRAA >59 07/08/2021       Medications    Current Facility-Administered Medications:     asenapine maleate (SAPHRIS) sublingual tablet 2.5 mg, 2.5 mg, Sublingual, Nightly, Marcela Lisa MD    losartan (COZAAR) tablet 100 mg, 100 mg, Oral, Daily, Marcela Lisa MD, 100 mg at 07/10/21 0816    amLODIPine (NORVASC) tablet 10 mg, 10 mg, Oral, Daily, Marcela Lisa MD, 10 mg at 07/10/21 0816    hydroCHLOROthiazide (HYDRODIURIL) tablet 25 mg, 25 mg, Oral, Daily, Marcela Lisa MD, 25 mg at 07/10/21 0816    nicotine polacrilex (NICORETTE) gum 2 mg, 2 mg, Oral, PRN, Marcela Lisa MD, 2 mg at 07/09/21 2020    hydrOXYzine (ATARAX) tablet 25 mg, 25 mg, Oral, TID PRN, Marcela Lisa MD    pantoprazole (PROTONIX) tablet 40 mg, 40 mg, Oral, QAM AC, Rachel Benavidez MD, 40 mg at 07/10/21 0816    albuterol (PROVENTIL) nebulizer solution 2.5 mg, 2.5 mg, Nebulization, Q6H PRN, Marcela Lisa MD    metoprolol succinate (TOPROL XL) extended release tablet 75 mg, 75 mg, Oral, Daily, Marcela Lisa MD, 75 mg at 07/10/21 0815    meloxicam (MOBIC) tablet 7.5 mg, 7.5 mg, Oral, Daily, Marcela Lisa MD, 7.5 mg at 07/10/21 0816    traZODone (DESYREL) tablet 50 mg, 50 mg, Oral, Nightly PRN, Alysia Shaw MD    acetaminophen (TYLENOL) tablet 650 mg, 650 mg, Oral, Q4H PRN, Alysia Shaw MD, 650 mg at 07/09/21 0911    polyethylene glycol (GLYCOLAX) packet 17 g, 17 g, Oral, Daily PRN, Alysia Shaw MD    melatonin disintegrating tablet 5 mg, 5 mg, Oral, Nightly PRN, Bello Burgos RN, 5 mg at 07/08/21 2120    ASSESSMENT AND PLAN  DSM 5 DIAGNOSIS  Impression  Mood disorder unspecified  Cluster B traits  Cocaine use disorder, in  early remission  Cannabis use disorder, in early remission  Tobacco use disorder     Medical issues  Chronic pain  HTN  Anemia  Thrombocytosis    Overall, improving. Continue to observe. Plan:   1. Psychiatric Medications:   Decrease Saphris. Monitor for side effects. The risks, benefits, side effects, indications, contraindications, alternatives and adverse effects of the medications have been discussed with patient. 2. Continue to provide supportive psychotherapy. Encourage socialization and participation in recreational activities. Work on coping skills. 3. Medical Issues:    Continue medical monitoring by Dr. Malvin Siemens and associates. Labs pending. 4. Disposition:     to provide outpatient resources and facilitate disposition.      Amount of time spent with patient:      15 minutes with greater than 50 % of the time spent in counseling and collaboration of care

## 2021-07-11 LAB
BASOPHILS ABSOLUTE: 0 K/UL (ref 0–0.2)
BASOPHILS RELATIVE PERCENT: 0.5 % (ref 0–1)
EOSINOPHILS ABSOLUTE: 0.3 K/UL (ref 0–0.6)
EOSINOPHILS RELATIVE PERCENT: 3.7 % (ref 0–5)
HCT VFR BLD CALC: 37.3 % (ref 37–47)
HEMOGLOBIN: 11.8 G/DL (ref 12–16)
IMMATURE GRANULOCYTES #: 0 K/UL
LYMPHOCYTES ABSOLUTE: 2.3 K/UL (ref 1.1–4.5)
LYMPHOCYTES RELATIVE PERCENT: 26.7 % (ref 20–40)
MCH RBC QN AUTO: 22.4 PG (ref 27–31)
MCHC RBC AUTO-ENTMCNC: 31.6 G/DL (ref 33–37)
MCV RBC AUTO: 70.8 FL (ref 81–99)
MONOCYTES ABSOLUTE: 0.7 K/UL (ref 0–0.9)
MONOCYTES RELATIVE PERCENT: 7.8 % (ref 0–10)
NEUTROPHILS ABSOLUTE: 5.2 K/UL (ref 1.5–7.5)
NEUTROPHILS RELATIVE PERCENT: 61.1 % (ref 50–65)
PDW BLD-RTO: 15.1 % (ref 11.5–14.5)
PLATELET # BLD: 560 K/UL (ref 130–400)
PMV BLD AUTO: 9.3 FL (ref 9.4–12.3)
RBC # BLD: 5.27 M/UL (ref 4.2–5.4)
WBC # BLD: 8.5 K/UL (ref 4.8–10.8)

## 2021-07-11 PROCEDURE — 6370000000 HC RX 637 (ALT 250 FOR IP): Performed by: PSYCHIATRY & NEUROLOGY

## 2021-07-11 PROCEDURE — 1240000000 HC EMOTIONAL WELLNESS R&B

## 2021-07-11 PROCEDURE — 85025 COMPLETE CBC W/AUTO DIFF WBC: CPT

## 2021-07-11 PROCEDURE — 36415 COLL VENOUS BLD VENIPUNCTURE: CPT

## 2021-07-11 RX ORDER — LORAZEPAM 2 MG/ML
2 INJECTION INTRAMUSCULAR ONCE
Status: DISCONTINUED | OUTPATIENT
Start: 2021-07-11 | End: 2021-07-12

## 2021-07-11 RX ORDER — HALOPERIDOL 5 MG/ML
5 INJECTION INTRAMUSCULAR ONCE
Status: DISCONTINUED | OUTPATIENT
Start: 2021-07-11 | End: 2021-07-12

## 2021-07-11 RX ORDER — HALOPERIDOL 5 MG/ML
5 INJECTION INTRAMUSCULAR EVERY 6 HOURS PRN
Status: DISCONTINUED | OUTPATIENT
Start: 2021-07-11 | End: 2021-07-13 | Stop reason: HOSPADM

## 2021-07-11 RX ADMIN — PANTOPRAZOLE SODIUM 40 MG: 40 TABLET, DELAYED RELEASE ORAL at 08:37

## 2021-07-11 RX ADMIN — LOSARTAN POTASSIUM 100 MG: 100 TABLET, FILM COATED ORAL at 08:37

## 2021-07-11 RX ADMIN — ACETAMINOPHEN 650 MG: 325 TABLET ORAL at 12:07

## 2021-07-11 RX ADMIN — METOPROLOL SUCCINATE 75 MG: 50 TABLET, EXTENDED RELEASE ORAL at 08:37

## 2021-07-11 RX ADMIN — MELOXICAM 7.5 MG: 7.5 TABLET ORAL at 08:38

## 2021-07-11 RX ADMIN — ASENAPINE MALEATE 2.5 MG: 2.5 TABLET SUBLINGUAL at 20:52

## 2021-07-11 RX ADMIN — AMLODIPINE BESYLATE 10 MG: 10 TABLET ORAL at 08:37

## 2021-07-11 RX ADMIN — ACETAMINOPHEN 650 MG: 325 TABLET ORAL at 20:52

## 2021-07-11 RX ADMIN — HYDROCHLOROTHIAZIDE 25 MG: 25 TABLET ORAL at 08:38

## 2021-07-11 ASSESSMENT — PAIN SCALES - GENERAL
PAINLEVEL_OUTOF10: 7
PAINLEVEL_OUTOF10: 3
PAINLEVEL_OUTOF10: 6

## 2021-07-11 NOTE — PROGRESS NOTES
Group Therapy Note    Start Time: 800  End Time:  933  Number of Participants: 11    Type of Group: Community Meeting       Patient's Goal:  \"anger\"      Notes:      Participation Level:  Active Listener       Participation Quality: Appropriate      Thought Process/Content: Logical      Affective Functioning: Congruent      Mood: calm      Level of consciousness:  Alert      Modes of Intervention: Support      Discipline Responsible: Behavioral Health Tech II      Signature:  Enoc Fontanez

## 2021-07-11 NOTE — PROGRESS NOTES
Patient watching television with peers and calm. Informed patient that we will be having to move another patient into her room so that we will be able to accommodate a new patient coming up from the floor. Patient became instantly agitated saying \"I have PTSD, I can't have anyone else in my room. \" Revised plan so that we would move patient out of the double room, while moving another patient out of a single room into the double room patient is currently in, only to immediately move the patient into a single room herself this way our double room would be free and patient would not have to worry about a roommate. Patient became angry, agitated and belligerent screaming at staff and tipping over tables. This angered another patient who started yelling insults at this patient until we called a code violet and got security on the floor and contacted Dr. Chula Cuellar for orders. Meanwhile, staff talked to both patients, calming them down enough that shots were not needed.

## 2021-07-11 NOTE — PROGRESS NOTES
[facility] Adult Unit Daily Assessment  Nursing Progress Note    Room: [facility] [place]/605-01   Name: Lalita Haines   Age: 39 y.o. Gender: female   Dx: <principal problem not specified>  Precautions: suicide risk  Inpatient Status: voluntary       SLEEP:    Sleep Quality Good  Sleep Medications: No   PRN Sleep Meds: No       MEDICAL:    Other PRN Meds: No   Med Compliant: Yes  Accu-Chek: No  Oxygen/CPAP/BiPAP: No  CIWA/CINA: No   PAIN Assessment: none  Side Effects from medication: No    Is Patient experiencing any respiratory symptoms (headache, fever, body aches, cough. Berlinlynn Magic ): no  Patient educated by nursing to practice social distancing, wear masks, wash hands frequently: yes      PSYCH:    Depression: 6   Anxiety: 8   SI denies suicidal ideation   HI Negative for homicidal ideation      AVH:Absent      GENERAL:    Appetite: improved    Social: No   Speech: normal   Appearance: appropriately dressed, appropriately groomed, good hygiene and healthy looking    GROUP:    Group Participation: Yes  Participation Quality: Minimal    Notes: Pt is pleasant during this interview. Earlier pt had been at the desk asking for her evening medications While I was assisting another pt,when I explained it would be a few minutes ,she became impatient. Described herself as becoming agitated. Pt requested her nightly saphris be given the patient received saphris 2.5 mg the patient declined trazodone 50mg and melatonin 5mg. Pt pleasant and cooperative after saphris began to work. Pt reports the event at the rehab was a misunderstanding that got out of control. States she at times reacts to others behaviors aggressively. Will continue to monitor for safety.

## 2021-07-11 NOTE — PROGRESS NOTES
Pt declined to have lab work drawn,yesterday and today. Discussed this with patient . She informed me she was in a bad mood and did not know when she would let them draw blood.

## 2021-07-11 NOTE — PROGRESS NOTES
[facility] Daily Shift Assessment  Nursing Progress Note    Room: 0606/606-01 Name: Minh Moreno Age: 39 y.o. Gender: female   Dx: <principal problem not specified>  Precautions: suicide risk  Target Symptoms:   Accu-Chek: NoSleep: Yes,Sleep Quality Enough SI No AVH Denies   [address] [place] [address]  ADLs: Yes Speech: normal Depression: 0 Anxiety: 7   Participation LevelActive Listener and Interactive  Appetite: Good  Respiratory symptoms: No Headache: No Body aches: No Fever: No Cough: No  Patients encouraged to wear masks, wash hands frequently and practice social distancing while on the unit: Yes  Visitation: No   Participation QualityAppropriate and Attentive    Complaints:    Notes: Patient spent most of the day watching television with peers. Patient pleasant until later in the day when patient was required to move to a different room. At this point patient became combative and angry. During this interview, patient was denying depression but rating her anxiety as a 7- mostly because she does not know what will happen tomorrow when she is discharged.      Signature:

## 2021-07-12 PROBLEM — F14.11 COCAINE USE DISORDER, MILD, IN EARLY REMISSION (HCC): Chronic | Status: ACTIVE | Noted: 2021-07-12

## 2021-07-12 PROBLEM — F12.11 CANNABIS USE DISORDER, MILD, IN EARLY REMISSION: Chronic | Status: ACTIVE | Noted: 2021-07-12

## 2021-07-12 PROBLEM — F17.200 TOBACCO USE DISORDER: Chronic | Status: ACTIVE | Noted: 2021-07-12

## 2021-07-12 PROCEDURE — 1240000000 HC EMOTIONAL WELLNESS R&B

## 2021-07-12 PROCEDURE — 6370000000 HC RX 637 (ALT 250 FOR IP): Performed by: PSYCHIATRY & NEUROLOGY

## 2021-07-12 PROCEDURE — 99231 SBSQ HOSP IP/OBS SF/LOW 25: CPT | Performed by: PSYCHIATRY & NEUROLOGY

## 2021-07-12 RX ORDER — ASENAPINE MALEATE 2.5 MG/1
2.5 TABLET SUBLINGUAL NIGHTLY
Qty: 30 TABLET | Refills: 0
Start: 2021-07-12 | End: 2021-08-11

## 2021-07-12 RX ORDER — TRAZODONE HYDROCHLORIDE 50 MG/1
50 TABLET ORAL NIGHTLY PRN
Qty: 30 TABLET | Refills: 1 | Status: SHIPPED | OUTPATIENT
Start: 2021-07-12 | End: 2021-08-11

## 2021-07-12 RX ORDER — MECOBALAMIN 5000 MCG
5 TABLET,DISINTEGRATING ORAL NIGHTLY PRN
Qty: 30 TABLET | Refills: 1 | Status: SHIPPED | OUTPATIENT
Start: 2021-07-12 | End: 2021-08-11

## 2021-07-12 RX ADMIN — NICOTINE POLACRILEX 2 MG: 2 GUM, CHEWING BUCCAL at 12:29

## 2021-07-12 RX ADMIN — PANTOPRAZOLE SODIUM 40 MG: 40 TABLET, DELAYED RELEASE ORAL at 08:35

## 2021-07-12 RX ADMIN — AMLODIPINE BESYLATE 10 MG: 10 TABLET ORAL at 08:35

## 2021-07-12 RX ADMIN — ACETAMINOPHEN 650 MG: 325 TABLET ORAL at 12:28

## 2021-07-12 RX ADMIN — HYDROXYZINE HYDROCHLORIDE 25 MG: 25 TABLET, FILM COATED ORAL at 21:45

## 2021-07-12 RX ADMIN — HYDROCHLOROTHIAZIDE 25 MG: 25 TABLET ORAL at 08:35

## 2021-07-12 RX ADMIN — METOPROLOL SUCCINATE 75 MG: 50 TABLET, EXTENDED RELEASE ORAL at 08:35

## 2021-07-12 RX ADMIN — MELOXICAM 7.5 MG: 7.5 TABLET ORAL at 08:35

## 2021-07-12 RX ADMIN — LOSARTAN POTASSIUM 100 MG: 100 TABLET, FILM COATED ORAL at 08:35

## 2021-07-12 ASSESSMENT — PAIN SCALES - GENERAL
PAINLEVEL_OUTOF10: 8
PAINLEVEL_OUTOF10: 9

## 2021-07-12 NOTE — PLAN OF CARE
Problem: Anger Management/Homicidal Ideation:  Goal: Absence of homicidal ideation  Description: Absence of homicidal ideation  Outcome: Ongoing     Problem: Suicide risk  Goal: Provide patient with safe environment  Description: Provide patient with safe environment  Outcome: Ongoing     Problem: Depressive Behavior With or Without Suicide Precautions:  Goal: Absence of self-harm  Description: Absence of self-harm  Outcome: Ongoing

## 2021-07-12 NOTE — PROGRESS NOTES
Pt refused to go to her room during a code Anneliese. Pt agitated & defiant, yelling & cursing staff. 'Why do I have to go to my room?'  Pt threatened staff member Naila Spear, saying 'better get behind the desk before I beat your ass.' Pt encouraging a peer of hostile actions, 'you should have got in his shit.' [facility], RN attempting to redirect & calm patient.     Electronically signed by Elizabeth Ramirez RN on 7/12/2021 at 5:36 AM

## 2021-07-12 NOTE — PROGRESS NOTES
[facility] Daily Shift Assessment  Nursing Progress Note    Room: [facility] [place] [facility]/606-01 Name: Mike Castillo Age: 39 y.o. Gender: female   Dx: <principal problem not specified>  Precautions: close watch and suicide risk  Target Symptoms:   Accu-Chek: NoSleep: No,Sleep Quality Fair SI No AVH denies [address] [place] [address]  ADLs: Yes Speech: normal Depression: 0 Anxiety: 0   Participation LevelActive Listener  Appetite: Good  Respiratory symptoms: No Headache: No Body aches: No Fever: No Cough: No  Patients encouraged to wear masks, wash hands frequently and practice social distancing while on the unit: Yes  Visitation: No   Participation QualityAppropriate and Attentive    Complaints: none    Notes: Patient is calm, cooperative and coherent.     Signature: Landry Castro RN

## 2021-07-12 NOTE — PLAN OF CARE
Group Therapy Note     Date: 7/12/2021  Start Time: 1100  End Time:  1130  Number of Participants: 11     Type of Group: Psychotherapy     Wellness Binder Information  Module Name:  emotional wellness  Session Number:  1     Patient's Goal:  validation of feelings     Notes:  pt was verbally prompted to attend group. Pt refused. Information about emotional wellness was provided. Status After Intervention:       Participation Level:      Participation Quality:         Speech:           Thought Process/Content:         Affective Functioning:         Mood:         Level of consciousness:          Response to Learning:         Endings:      Modes of Intervention:         Discipline Responsible: Psychoeducational Specialist        Signature:  Deshaun Parsons

## 2021-07-12 NOTE — PLAN OF CARE
Group Therapy Note     Date: 7/12/2021  Start Time: 9676  End Time:  1600  Number of Participants: 6     Type of Group: Recovery     Wellness Binder Information  Module Name:  staying well  Session Number:  1     Patient's Goal:  daily maintenance and coping skills     Notes:  pt was verbally prompted to attend group. Pt refused. Information about coping skills was provided. Status After Intervention:       Participation Level:      Participation Quality:         Speech:           Thought Process/Content:         Affective Functioning:         Mood:         Level of consciousness:          Response to Learning:         Endings:      Modes of Intervention:         Discipline Responsible: Psychoeducational Specialist        Signature:  Deshaun Parsons

## 2021-07-12 NOTE — PROGRESS NOTES
Pt refused blood draw for labs. Rescheduled for tomorrow a.m.     Electronically signed by Bhavik Vee RN on 7/12/2021 at 5:26 AM

## 2021-07-12 NOTE — PROGRESS NOTES
Group Therapy Note    Start Time: 800  End Time:  900  Number of Participants: 17    Type of Group: Community Meeting       Patient's Goal:  \"Getting better\"      Notes:        Participation Level:  Active Listener       Participation Quality: Appropriate      Thought Process/Content: Logical      Affective Functioning: Congruent      Mood: Calm      Level of consciousness:  Alert      Modes of Intervention: Support      Discipline Responsible: Behavioral Health Tech II      Signature:  Kim Maguire

## 2021-07-12 NOTE — PROGRESS NOTES
CLINICAL PHARMACY NOTE: MEDS TO BEDS    Total # of Prescriptions Filled: 2   The following medications were delivered to the patient:  · Melatonin 5 mg  · Trazodone 50 mg    Additional Documentation:    Handed scripts to Meron (Rn) at nurses station

## 2021-07-12 NOTE — PLAN OF CARE
Problem: Depressive Behavior With or Without Suicide Precautions:  Goal: Able to verbalize and/or display a decrease in depressive symptoms  Description: Able to verbalize and/or display a decrease in depressive symptoms  7/12/2021 0947 by Trudi Palacios RN  Outcome: Ongoing  7/12/2021 0926 by Trudi Palacios RN  Outcome: Ongoing     Problem: Depressive Behavior With or Without Suicide Precautions:  Goal: Ability to disclose and discuss suicidal ideas will improve  Description: Ability to disclose and discuss suicidal ideas will improve  7/12/2021 0947 by Trudi Palacios RN  Outcome: Ongoing  7/12/2021 0926 by Trudi Palacios RN  Outcome: Ongoing

## 2021-07-12 NOTE — PROGRESS NOTES
Department of Psychiatry  Attending Progress Note     Chief complaint: \"Doing ok\"    SUBJECTIVE:   Chart reviewed, discussed with the team.  Patient got into a verbal altercation with a patient yesterday. She was redirectable. Patient was seen resting in bed this morning. \"Doing ok. \" Upset about yesterday. We processed her feelings. Supportive psychotherapy provided. No physical complaints today. \"I'd like to go to sober living in [place]. \"    OBJECTIVE    Physical  Wt Readings from Last 3 Encounters:   07/08/21 197 lb (89.4 kg)     Temp Readings from Last 3 Encounters:   07/12/21 96.6 °F (35.9 °C) (Temporal)     BP Readings from Last 3 Encounters:   07/12/21 (!) 153/91     Pulse Readings from Last 3 Encounters:   07/12/21 85        Review of Systems: 14-point review of systems negative except as described above    Mental Status Examination:   Appearance:  Stated age. Gait stable. No abnormal movements or tremor. Behavior: Calm, cooperative, smiled  Speech: Normal in tone, volume, and quality. No slurring, dysarthria or pressured speech noted. Mood: \"ok \"   Affect: Mood congruent. Thought Process: Appears linear. Thought Content:  Denies SI/HI. No overt delusions or paranoia appreciated. Perceptions: Denies auditory or visual hallucinations at present time. Not responding to internal stimuli. Concentration: Intact. Orientation: to person, place, date, and situation. Language: Intact. Fund of information: Intact. Memory: Recent and remote appear intact. Neurovegitative: Fair appetite and sleep. Insight: Improving. Judgment: Improving.     Data  Lab Results   Component Value Date    WBC 8.5 07/11/2021    HGB 11.8 (L) 07/11/2021    HCT 37.3 07/11/2021    MCV 70.8 (L) 07/11/2021     (H) 07/11/2021      Lab Results   Component Value Date     (L) 07/08/2021    K 3.8 07/08/2021    CL 98 07/08/2021    CO2 28 07/08/2021    BUN 12 07/08/2021    CREATININE 0.6 07/08/2021 GLUCOSE 121 (H) 07/08/2021    CALCIUM 10.1 (H) 07/08/2021    PROT 7.8 07/08/2021    LABALBU 4.4 07/08/2021    BILITOT <0.2 07/08/2021    ALKPHOS 63 07/08/2021    AST 23 07/08/2021    ALT 20 07/08/2021    LABGLOM >60 07/08/2021    GFRAA >59 07/08/2021       Medications    Current Facility-Administered Medications:     haloperidol lactate (HALDOL) injection 5 mg, 5 mg, Intramuscular, Once, Marcela Lisa MD    LORazepam (ATIVAN) injection 2 mg, 2 mg, Intramuscular, Once, Marcela Lisa MD    haloperidol lactate (HALDOL) injection 5 mg, 5 mg, Intramuscular, Q6H PRN, Marcela Lisa MD    LORazepam (ATIVAN) injection 2 mg, 2 mg, Intramuscular, Once, Marcela Lisa MD    asenapine maleate (SAPHRIS) sublingual tablet 2.5 mg, 2.5 mg, Sublingual, Nightly, Marcela Lisa MD, 2.5 mg at 07/11/21 2052    losartan (COZAAR) tablet 100 mg, 100 mg, Oral, Daily, Marcela Lisa MD, 100 mg at 07/12/21 0835    amLODIPine (NORVASC) tablet 10 mg, 10 mg, Oral, Daily, Marcela Lisa MD, 10 mg at 07/12/21 0835    hydroCHLOROthiazide (HYDRODIURIL) tablet 25 mg, 25 mg, Oral, Daily, Marcela Lisa MD, 25 mg at 07/12/21 0835    nicotine polacrilex (NICORETTE) gum 2 mg, 2 mg, Oral, PRN, Marcela Lisa MD, 2 mg at 07/10/21 1530    hydrOXYzine (ATARAX) tablet 25 mg, 25 mg, Oral, TID PRN, Marcela Lisa MD    pantoprazole (PROTONIX) tablet 40 mg, 40 mg, Oral, QAM AC, Rachel Benavidez MD, 40 mg at 07/12/21 0835    albuterol (PROVENTIL) nebulizer solution 2.5 mg, 2.5 mg, Nebulization, Q6H PRN, Marcela Lisa MD    metoprolol succinate (TOPROL XL) extended release tablet 75 mg, 75 mg, Oral, Daily, Marcela Lisa MD, 75 mg at 07/12/21 0835    meloxicam (MOBIC) tablet 7.5 mg, 7.5 mg, Oral, Daily, Marcela Lisa MD, 7.5 mg at 07/12/21 0835    traZODone (DESYREL) tablet 50 mg, 50 mg, Oral, Nightly PRN, Alysia Shaw MD    acetaminophen (TYLENOL) tablet 650 mg, 650 mg, Oral, Q4H PRN, Alysia Shaw MD, 650 mg at 07/11/21 2052    polyethylene glycol (GLYCOLAX) packet 17 g, 17 g, Oral, Daily PRN, Gerald Garces MD    melatonin disintegrating tablet 5 mg, 5 mg, Oral, Nightly PRN, Zenon Dancer, RN, 5 mg at 07/08/21 2120    ASSESSMENT AND PLAN  DSM 5 DIAGNOSIS  Impression  Mood disorder unspecified  Cluster B traits  Cocaine use disorder, in  early remission  Cannabis use disorder, in early remission  Tobacco use disorder     Medical issues  Chronic pain  HTN  Anemia  Thrombocytosis    Overall, improving. Continue to observe. Plan:   1. Psychiatric Medications:   Continue current regimen. Monitor for side effects. The risks, benefits, side effects, indications, contraindications, alternatives and adverse effects of the medications have been discussed with patient. 2. Continue to provide supportive psychotherapy. Encourage socialization and participation in recreational activities. Work on coping skills. 3. Medical Issues:    Continue medical monitoring by Dr. Daniel Gerard and associates. Labs pending. 4. Disposition:     to provide outpatient resources and facilitate disposition.      Amount of time spent with patient:      15 minutes with greater than 50 % of the time spent in counseling and collaboration of care

## 2021-07-12 NOTE — PLAN OF CARE
Problem: Depressive Behavior With or Without Suicide Precautions:  Goal: Able to verbalize acceptance of life and situations over which he or she has no control  Description: Able to verbalize acceptance of life and situations over which he or she has no control  7/12/2021 1254 by Savage Davies  Outcome: Ongoing  Note:                                                                     Group Therapy Note    Date: 7/12/2021  Start Time: 1000  End Time:  1030  Number of Participants: 10    Type of Group: Psychoeducation    Wellness Binder Information  Module Name:  Relapse Prevention  Session Number:  5    Group Goal for Pt: To improve knowledge of relapse prevention strategies    Notes: Pt demonstrated improved knowledge of relapse prevention strategies by actively participating in group discussion. Status After Intervention:  Unchanged    Participation Level:  Active Listener and Interactive    Participation Quality: Appropriate and Attentive      Speech:  normal      Thought Process/Content: Logical      Affective Functioning: Congruent      Mood: anxious and depressed      Level of consciousness:  Alert and Oriented x4      Response to Learning: Able to verbalize current knowledge/experience, Able to verbalize/acknowledge new learning, and Progressing to goal      Endings: None Reported    Modes of Intervention: Education      Discipline Responsible: Psychoeducational Specialist      Signature:  Savage Davies

## 2021-07-13 VITALS
DIASTOLIC BLOOD PRESSURE: 56 MMHG | OXYGEN SATURATION: 95 % | BODY MASS INDEX: 33.63 KG/M2 | HEIGHT: 64 IN | WEIGHT: 197 LBS | RESPIRATION RATE: 16 BRPM | HEART RATE: 78 BPM | SYSTOLIC BLOOD PRESSURE: 111 MMHG | TEMPERATURE: 97.8 F

## 2021-07-13 PROCEDURE — 6370000000 HC RX 637 (ALT 250 FOR IP): Performed by: PSYCHIATRY & NEUROLOGY

## 2021-07-13 PROCEDURE — 99239 HOSP IP/OBS DSCHRG MGMT >30: CPT | Performed by: PSYCHIATRY & NEUROLOGY

## 2021-07-13 PROCEDURE — 5130000000 HC BRIDGE APPOINTMENT

## 2021-07-13 RX ADMIN — HYDROCHLOROTHIAZIDE 25 MG: 25 TABLET ORAL at 07:46

## 2021-07-13 RX ADMIN — ACETAMINOPHEN 650 MG: 325 TABLET ORAL at 14:13

## 2021-07-13 RX ADMIN — PANTOPRAZOLE SODIUM 40 MG: 40 TABLET, DELAYED RELEASE ORAL at 07:47

## 2021-07-13 RX ADMIN — METOPROLOL SUCCINATE 75 MG: 50 TABLET, EXTENDED RELEASE ORAL at 07:46

## 2021-07-13 RX ADMIN — LOSARTAN POTASSIUM 100 MG: 100 TABLET, FILM COATED ORAL at 07:47

## 2021-07-13 RX ADMIN — AMLODIPINE BESYLATE 10 MG: 10 TABLET ORAL at 07:47

## 2021-07-13 RX ADMIN — MELOXICAM 7.5 MG: 7.5 TABLET ORAL at 07:47

## 2021-07-13 ASSESSMENT — PAIN DESCRIPTION - PROGRESSION
CLINICAL_PROGRESSION: RAPIDLY WORSENING
CLINICAL_PROGRESSION: GRADUALLY IMPROVING

## 2021-07-13 ASSESSMENT — PAIN DESCRIPTION - FREQUENCY
FREQUENCY: INTERMITTENT
FREQUENCY: INTERMITTENT

## 2021-07-13 ASSESSMENT — PAIN DESCRIPTION - DESCRIPTORS
DESCRIPTORS: DISCOMFORT;DULL
DESCRIPTORS: ACHING;DISCOMFORT;DULL

## 2021-07-13 ASSESSMENT — PAIN DESCRIPTION - ORIENTATION
ORIENTATION: LOWER
ORIENTATION: LOWER

## 2021-07-13 ASSESSMENT — PAIN DESCRIPTION - LOCATION
LOCATION: BACK
LOCATION: BACK

## 2021-07-13 ASSESSMENT — PAIN SCALES - GENERAL
PAINLEVEL_OUTOF10: 4
PAINLEVEL_OUTOF10: 9
PAINLEVEL_OUTOF10: 0
PAINLEVEL_OUTOF10: 5

## 2021-07-13 ASSESSMENT — PAIN DESCRIPTION - PAIN TYPE
TYPE: CHRONIC PAIN
TYPE: CHRONIC PAIN

## 2021-07-13 ASSESSMENT — PAIN - FUNCTIONAL ASSESSMENT
PAIN_FUNCTIONAL_ASSESSMENT: ACTIVITIES ARE NOT PREVENTED
PAIN_FUNCTIONAL_ASSESSMENT: ACTIVITIES ARE NOT PREVENTED

## 2021-07-13 ASSESSMENT — PAIN DESCRIPTION - ONSET
ONSET: ON-GOING
ONSET: GRADUAL

## 2021-07-13 NOTE — SUICIDE SAFETY PLAN
SAFETY PLAN    A suicide Safety Plan is a document that supports someone when they are having thoughts of suicide. Warning Signs that indicate a suicidal crisis may be developing: What (situations, thoughts, feelings, body sensations, behaviors, etc.) do you experience that lets you know you are beginning to think about suicide? 1.    2.    3. Internal Coping Strategies:  What things can I do (relaxation techniques, hobbies, physical activities, etc.) to take my mind off my problems without contacting another person? 1.    2.    3.      People and social settings that provide distraction: Who can I call or where can I go to distract me? 1. Name:    Phone:    2. Name:    Phone:     3. Place:              4. Place:      People whom I can ask for help: Who can I call when I need help - for example, friends, family, clergy, someone else? 1. Name:                  Phone:     2. Name:     Phone:    3. Name:    Phone:       Professionals or [address] [place] [address] I can contact during a crisis: Who can I call for help - for example, my doctor, my psychiatrist, my psychologist, a mental health provider, a suicide hotline? 1. Clinician Name: [id number]   Phone: [phone]      Clinician Pager or Emergency Contact #: [phone]    3. Clinician Name: [id number] [place] [address]   Phone: [phone]      Clinician Pager or Emergency Contact #: [phone]    8. Suicide Prevention Lifeline: 3-448-595-TALK (1638)    4. [address] [place] [address] Emergency Services -  for example, [address], [facility] suicide hotline, [facility] Hotline: 211      Emergency Services Address: [address] [place] [address] Emergency Department [facility] [address]      Emergency Services Phone: [phone]    Making the environment safe: How can I make my environment (house/apartment/living space) safer? For example, can I remove guns, medications, and other items? 1.  Remove weapons from the home  2.  Remove extra medication from the home

## 2021-07-13 NOTE — PROGRESS NOTES
Discharge Note     Pt discharging on this date. Pt denies SI, HI, and AVH at this time. Pt reports improvement in behavior and is leaving unit in overall good condition. SW and pt discussed pt's follow up appointments and importance of attending appointments as scheduled, pt voiced understanding and agreement. Pt and SW also discussed pt safety plan and pt able to verbally identify: warning signs, coping strategies, places and people that help make the pt feel better/distract negative thoughts, friends/family/agencies/professionals the pt can reach out to in a crisis, and something that is important to the pt/worth living for. Pt provided the national suicide prevention hotline number (2-174-798-5637) as well as local community behavioral health [facility]) crisis number for emergencies (1-857.738.4976). Pt to follow up with:  [facility] for therapy/dubstance abuse treatment and medication mangment. Referral to out patient tobacco cessation counseling treatment:    Patient refused referral to outpatient tobacco cessation counseling    SW offered to assist pt with transportation, pt reports that she needs assistance with transportation. JACLYN arranged for the patient to be picked up by a member from [facility] staff.

## 2021-07-13 NOTE — PROGRESS NOTES
[facility]  Discharge Note  Bridge Appointment completed: Reviewed Discharge Instructions with patient. Patient verbalizes understanding and agreement with the discharge plan using the teachback method. Referral for Outpatient Tobacco Cessation Counseling, upon discharge (jeff X if applicable and completed):    ( )  Hospital staff assisted patient to call Quit Line or faxed referral                                   during hospitalization                  ( )  Recognizing danger situations (included triggers and roadblocks), if not completed on admission                    ( )  Coping skills (new ways to manage stress, exercise, relaxation techniques, changing routine, distraction), if not completed on admission                                                           ( )  Basic information about quitting (benefits of quitting, techniques in how to quit, available resources, if not completed on admission  ( ) Referral for counseling faxed to [facility]   ( ) Patient refused referral  ( ) Patient refused counseling  (x ) Patient refused smoking cessation medication upon discharge    Vaccinations (jeff X if applicable and completed):  ( ) Patient states already received influenza vaccine elsewhere  ( ) Patient received influenza vaccine during this hospitalization  ( ) Patient refused influenza vaccine at this time      Pt discharged with followings belongings:   Dentures: None  Vision - Corrective Lenses: None  Hearing Aid: None  Body Piercings Removed: No  Clothing: Footwear, Pants, Shirt, Socks, Undergarments (Comment)  Were All Patient Medications Collected?: Not Applicable    Valuables retrieved from safe and returned to patient. Patient left department with staff member and security   via wheelchair to [facility] vehicle  , discharged to home  . Patient education on aftercare instructions: yes  Patient verbalize understanding of AVS:  Yes . Suicidal Ideations? No AVH? denies HI?  Negative for homicidal ideation       Status EXAM upon discharge:  Status and Exam  Normal: No  Facial Expression: Elevated, Exaggerated  Affect: Congruent  Level of Consciousness: Alert  Mood:Normal: No  Mood: Anxious, Labile, Suspicious, Irritable  Motor Activity:Normal: Yes  Motor Activity: Decreased  Interview Behavior: Cooperative  Preception: [place] to Person, [address] to Time, [place] to Place, [place] to Situation  Attention:Normal: Yes  Attention: Unable to Concentrate  Thought Processes: Perseveration, Tangential  Thought Content:Normal: No  Thought Content: Preoccupations  Hallucinations: None  Delusions: No  Memory:Normal: No  Memory: Poor Recent  Insight and Judgment: No  Insight and Judgment: Poor Insight, Poor Judgment  Present Suicidal Ideation: No  Present Homicidal Ideation: No
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
## 2021-07-13 NOTE — PROGRESS NOTES
[facility] Adult Unit Daily Assessment  Nursing Progress Note    Room: 0606/606-01   Name: Олег Gallego   Age: 39 y.o. Gender: female   Dx: Persistent mood (affective) disorder, unspecified (HCC)  Precautions: suicide risk  Inpatient Status: voluntary       SLEEP:    Sleep Quality Fair  Sleep Medications: no   PRN Sleep Meds: patient refused       MEDICAL:    Other PRN Meds: Yes   Med Compliant: No patient refused saphris  Accu-Chek: No  Oxygen/CPAP/BiPAP: No  CIWA/CINA: No   PAIN Assessment: none  Side Effects from medication: Yes patient states royal is making her to sleep    Is Patient experiencing any respiratory symptoms (headache, fever, body aches, cough. Scott Dick ): no  Patient educated by nursing to practice social distancing, wear masks, wash hands frequently: yes      PSYCH:    Depression: 4   Anxiety: 7   SI denies suicidal ideation   HI Negative for homicidal ideation      AVH:Absent      GENERAL:    Appetite: good    Social: Yes   Speech: normal   Appearance: appropriately dressed, appropriately groomed and healthy looking    GROUP:    Group Participation: Yes  Participation Quality: Minimal    Notes:     Patient is alert, oriented, calm and cooperative with staff and peers this evening. Patient has been on the phone most of the evening speaking with [employer]. Patient has good eye contact during interview. patient is excited about discharge in the morning. Patient reports decrease in sleep due to restless legs. Patient refused her saphris tonight stating that she is afraid she would be to sleepy in the morning to leave. Patient requested to take atarax instead. Patient is still not sure what facility she is going to after discharge. Patient continues to be afraid of having a anger outburst in rehab. No obvious s/s of distress voiced or noted. Will continue to monitor.      Electronically signed by Vani Villanueva RN on 7/12/21 at 10:58 PM CDT

## 2022-05-12 ENCOUNTER — OFFICE VISIT (OUTPATIENT)
Dept: INTERNAL MEDICINE | Facility: CLINIC | Age: 47
End: 2022-05-12

## 2022-05-12 ENCOUNTER — LAB (OUTPATIENT)
Dept: LAB | Facility: HOSPITAL | Age: 47
End: 2022-05-12

## 2022-05-12 VITALS
WEIGHT: 210 LBS | TEMPERATURE: 98 F | HEART RATE: 73 BPM | SYSTOLIC BLOOD PRESSURE: 146 MMHG | DIASTOLIC BLOOD PRESSURE: 94 MMHG | HEIGHT: 64 IN | OXYGEN SATURATION: 97 % | BODY MASS INDEX: 35.85 KG/M2

## 2022-05-12 DIAGNOSIS — Z12.31 ENCOUNTER FOR SCREENING MAMMOGRAM FOR MALIGNANT NEOPLASM OF BREAST: ICD-10-CM

## 2022-05-12 DIAGNOSIS — L85.3 DRY SKIN: ICD-10-CM

## 2022-05-12 DIAGNOSIS — Z12.4 SCREENING FOR CERVICAL CANCER: ICD-10-CM

## 2022-05-12 DIAGNOSIS — K13.79 MOUTH PAIN: Primary | ICD-10-CM

## 2022-05-12 DIAGNOSIS — M54.41 CHRONIC RIGHT-SIDED LOW BACK PAIN WITH RIGHT-SIDED SCIATICA: ICD-10-CM

## 2022-05-12 DIAGNOSIS — I10 ESSENTIAL HYPERTENSION: ICD-10-CM

## 2022-05-12 DIAGNOSIS — Z11.59 ENCOUNTER FOR HEPATITIS C VIRUS SCREENING TEST FOR HIGH RISK PATIENT: ICD-10-CM

## 2022-05-12 DIAGNOSIS — Z91.89 ENCOUNTER FOR HEPATITIS C VIRUS SCREENING TEST FOR HIGH RISK PATIENT: ICD-10-CM

## 2022-05-12 DIAGNOSIS — G89.29 CHRONIC RIGHT-SIDED LOW BACK PAIN WITH RIGHT-SIDED SCIATICA: ICD-10-CM

## 2022-05-12 DIAGNOSIS — Z72.51 HIGH RISK HETEROSEXUAL BEHAVIOR: ICD-10-CM

## 2022-05-12 LAB
ANISOCYTOSIS BLD QL: ABNORMAL
BURR CELLS BLD QL SMEAR: ABNORMAL
DEPRECATED RDW RBC AUTO: 40.5 FL (ref 37–54)
EOSINOPHIL # BLD MANUAL: 0.26 10*3/MM3 (ref 0–0.4)
EOSINOPHIL NFR BLD MANUAL: 4.1 % (ref 0.3–6.2)
ERYTHROCYTE [DISTWIDTH] IN BLOOD BY AUTOMATED COUNT: 16.3 % (ref 12.3–15.4)
HCT VFR BLD AUTO: 40.2 % (ref 34–46.6)
HCV AB SER DONR QL: NORMAL
HGB BLD-MCNC: 12.2 G/DL (ref 12–15.9)
HIV1+2 AB SER QL: NORMAL
LYMPHOCYTES # BLD MANUAL: 3.13 10*3/MM3 (ref 0.7–3.1)
LYMPHOCYTES NFR BLD MANUAL: 7.1 % (ref 5–12)
MCH RBC QN AUTO: 21.6 PG (ref 26.6–33)
MCHC RBC AUTO-ENTMCNC: 30.3 G/DL (ref 31.5–35.7)
MCV RBC AUTO: 71 FL (ref 79–97)
MONOCYTES # BLD: 0.46 10*3/MM3 (ref 0.1–0.9)
NEUTROPHILS # BLD AUTO: 2.55 10*3/MM3 (ref 1.7–7)
NEUTROPHILS NFR BLD MANUAL: 39.8 % (ref 42.7–76)
NRBC BLD AUTO-RTO: 0 /100 WBC (ref 0–0.2)
PLAT MORPH BLD: NORMAL
PLATELET # BLD AUTO: 548 10*3/MM3 (ref 140–450)
PMV BLD AUTO: 9.1 FL (ref 6–12)
POIKILOCYTOSIS BLD QL SMEAR: ABNORMAL
RBC # BLD AUTO: 5.66 10*6/MM3 (ref 3.77–5.28)
VARIANT LYMPHS NFR BLD MANUAL: 2 % (ref 0–5)
VARIANT LYMPHS NFR BLD MANUAL: 46.9 % (ref 19.6–45.3)
WBC MORPH BLD: NORMAL
WBC NRBC COR # BLD: 6.41 10*3/MM3 (ref 3.4–10.8)

## 2022-05-12 PROCEDURE — 85007 BL SMEAR W/DIFF WBC COUNT: CPT | Performed by: NURSE PRACTITIONER

## 2022-05-12 PROCEDURE — G0432 EIA HIV-1/HIV-2 SCREEN: HCPCS | Performed by: NURSE PRACTITIONER

## 2022-05-12 PROCEDURE — 85025 COMPLETE CBC W/AUTO DIFF WBC: CPT | Performed by: NURSE PRACTITIONER

## 2022-05-12 PROCEDURE — 80053 COMPREHEN METABOLIC PANEL: CPT | Performed by: NURSE PRACTITIONER

## 2022-05-12 PROCEDURE — 36415 COLL VENOUS BLD VENIPUNCTURE: CPT | Performed by: NURSE PRACTITIONER

## 2022-05-12 PROCEDURE — 86803 HEPATITIS C AB TEST: CPT | Performed by: NURSE PRACTITIONER

## 2022-05-12 PROCEDURE — 99204 OFFICE O/P NEW MOD 45 MIN: CPT | Performed by: NURSE PRACTITIONER

## 2022-05-12 RX ORDER — METOPROLOL SUCCINATE 25 MG/1
TABLET, EXTENDED RELEASE ORAL
COMMUNITY
End: 2022-05-12 | Stop reason: SDUPTHER

## 2022-05-12 RX ORDER — NAPROXEN 250 MG/1
TABLET ORAL
COMMUNITY
End: 2022-05-12

## 2022-05-12 RX ORDER — MELOXICAM 7.5 MG/1
TABLET ORAL
COMMUNITY
Start: 2022-04-18 | End: 2022-05-12 | Stop reason: SDUPTHER

## 2022-05-12 RX ORDER — METOPROLOL SUCCINATE 50 MG/1
50 TABLET, EXTENDED RELEASE ORAL DAILY
Qty: 30 TABLET | Refills: 5 | Status: SHIPPED | OUTPATIENT
Start: 2022-05-12

## 2022-05-12 RX ORDER — HYDROXYZINE HYDROCHLORIDE 25 MG/1
TABLET, FILM COATED ORAL
COMMUNITY
End: 2022-07-06 | Stop reason: SDUPTHER

## 2022-05-12 RX ORDER — FLUOXETINE HYDROCHLORIDE 40 MG/1
CAPSULE ORAL
COMMUNITY
Start: 2022-04-15 | End: 2022-05-12 | Stop reason: SDUPTHER

## 2022-05-12 RX ORDER — AMLODIPINE BESYLATE 5 MG/1
TABLET ORAL
COMMUNITY
Start: 2022-01-18 | End: 2022-05-12 | Stop reason: SDUPTHER

## 2022-05-12 RX ORDER — METOPROLOL SUCCINATE 50 MG/1
TABLET, EXTENDED RELEASE ORAL
COMMUNITY
End: 2022-05-12 | Stop reason: SDUPTHER

## 2022-05-12 RX ORDER — OMEPRAZOLE 40 MG/1
40 CAPSULE, DELAYED RELEASE ORAL DAILY
Qty: 30 CAPSULE | Refills: 5 | Status: SHIPPED | OUTPATIENT
Start: 2022-05-12 | End: 2022-07-06 | Stop reason: SDUPTHER

## 2022-05-12 RX ORDER — GABAPENTIN 400 MG/1
CAPSULE ORAL
COMMUNITY
Start: 2022-03-26 | End: 2022-05-12

## 2022-05-12 RX ORDER — MELOXICAM 15 MG/1
15 TABLET ORAL DAILY
Qty: 30 TABLET | Refills: 5 | Status: SHIPPED | OUTPATIENT
Start: 2022-05-12 | End: 2022-07-06

## 2022-05-12 RX ORDER — TRIAMCINOLONE ACETONIDE 1 MG/G
CREAM TOPICAL
Qty: 80 G | Refills: 2 | Status: SHIPPED | OUTPATIENT
Start: 2022-05-12

## 2022-05-12 RX ORDER — HYDROCHLOROTHIAZIDE 25 MG/1
TABLET ORAL
COMMUNITY
End: 2022-05-12 | Stop reason: SDUPTHER

## 2022-05-12 RX ORDER — ACYCLOVIR 400 MG/1
400 TABLET ORAL 2 TIMES DAILY
Qty: 60 TABLET | Refills: 5 | Status: SHIPPED | OUTPATIENT
Start: 2022-05-12

## 2022-05-12 RX ORDER — BUDESONIDE AND FORMOTEROL FUMARATE DIHYDRATE 160; 4.5 UG/1; UG/1
AEROSOL RESPIRATORY (INHALATION)
COMMUNITY
Start: 2022-04-18 | End: 2022-05-12 | Stop reason: SDUPTHER

## 2022-05-12 RX ORDER — ACYCLOVIR 400 MG/1
TABLET ORAL
COMMUNITY
Start: 2022-04-07 | End: 2022-05-12 | Stop reason: SDUPTHER

## 2022-05-12 RX ORDER — AMLODIPINE BESYLATE 10 MG/1
10 TABLET ORAL DAILY
Qty: 30 TABLET | Refills: 5 | Status: SHIPPED | OUTPATIENT
Start: 2022-05-12

## 2022-05-12 RX ORDER — LOSARTAN POTASSIUM 100 MG/1
TABLET ORAL
COMMUNITY
Start: 2022-04-18 | End: 2022-05-12 | Stop reason: SDUPTHER

## 2022-05-12 RX ORDER — POTASSIUM CHLORIDE 750 MG/1
TABLET, FILM COATED, EXTENDED RELEASE ORAL
COMMUNITY
End: 2022-05-12 | Stop reason: SDUPTHER

## 2022-05-12 RX ORDER — OMEPRAZOLE 40 MG/1
CAPSULE, DELAYED RELEASE ORAL
COMMUNITY
End: 2022-05-12 | Stop reason: SDUPTHER

## 2022-05-12 RX ORDER — HYDROCHLOROTHIAZIDE 25 MG/1
25 TABLET ORAL DAILY
Qty: 30 TABLET | Refills: 5 | Status: SHIPPED | OUTPATIENT
Start: 2022-05-12

## 2022-05-12 RX ORDER — POTASSIUM CHLORIDE 750 MG/1
10 TABLET, FILM COATED, EXTENDED RELEASE ORAL DAILY
Qty: 30 TABLET | Refills: 5 | Status: SHIPPED | OUTPATIENT
Start: 2022-05-12

## 2022-05-12 RX ORDER — TIZANIDINE 4 MG/1
TABLET ORAL
COMMUNITY
End: 2022-05-12 | Stop reason: SDUPTHER

## 2022-05-12 RX ORDER — METOPROLOL SUCCINATE 25 MG/1
25 TABLET, EXTENDED RELEASE ORAL DAILY
Qty: 30 TABLET | Refills: 5 | Status: SHIPPED | OUTPATIENT
Start: 2022-05-12

## 2022-05-12 RX ORDER — BUDESONIDE AND FORMOTEROL FUMARATE DIHYDRATE 160; 4.5 UG/1; UG/1
2 AEROSOL RESPIRATORY (INHALATION)
Qty: 1 EACH | Refills: 5 | Status: SHIPPED | OUTPATIENT
Start: 2022-05-12

## 2022-05-12 RX ORDER — LOSARTAN POTASSIUM 100 MG/1
100 TABLET ORAL DAILY
Qty: 30 TABLET | Refills: 5 | Status: SHIPPED | OUTPATIENT
Start: 2022-05-12

## 2022-05-12 RX ORDER — TIZANIDINE 4 MG/1
4 TABLET ORAL EVERY 8 HOURS PRN
Qty: 90 TABLET | Refills: 5 | Status: SHIPPED | OUTPATIENT
Start: 2022-05-12

## 2022-05-12 RX ORDER — FLUOXETINE HYDROCHLORIDE 40 MG/1
40 CAPSULE ORAL DAILY
Qty: 30 CAPSULE | Refills: 5 | Status: SHIPPED | OUTPATIENT
Start: 2022-05-12

## 2022-05-12 NOTE — PROGRESS NOTES
"Subjective   Chief Complaint   Patient presents with   • Establish Care   • Dental Pain   • Back Pain   • Leg Pain       Renata Cannon is a 46 y.o. female here today as a new patient to establish care. Pt is currently in sober living.  Pt has a history of cocaine abuse.  She has been clean for 90 days.  Pt states that she is currently on an antibiotic for an abscessed tooth.  She is currently on clindamycin.   She is having a lot of dental pain.  She states that she can taste the infection.    She states that her BP meds have been \"tampered with\" by doctors.  She feels like her Amlodipine needs to be increased.    Pt complains of chronic low back pain.  The pain radiates down the right leg.  She is asking for a prescription for Gabapentin.   She states that she has a degenerative disc disease.  Pt is requesting a refill on the Gabapentin.  She has been out of it for a few weeks.  Use to go to pain management for the back pain.  Took Hydrocodone and the dr wanted to discuss getting injections with her.  \"Meloxicam doesn't help much\".    In skilled nursing [id number] - imprisonment and assault  Complains of her ears itching on a daily basis.       Review of Systems   Constitutional: Negative for activity change, appetite change and fatigue.   HENT: Positive for dental problem. Negative for congestion.    Respiratory: Negative for cough and shortness of breath.    Cardiovascular: Negative for chest pain and leg swelling.   Gastrointestinal: Negative for abdominal pain.   Musculoskeletal: Positive for arthralgias (right leg) and back pain.   Neurological: Negative for dizziness, weakness and confusion.   Psychiatric/Behavioral: Negative for behavioral problems and decreased concentration.       Past Medical History:   Diagnosis Date   • Anxiety    • Cocaine abuse in remission ([facility])    • COPD (chronic obstructive pulmonary disease) ([facility])    • DDD (degenerative disc disease), lumbosacral    • Enlarged heart    • Gout    • Heart " murmur    • Sciatica      Past Surgical History:   Procedure Laterality Date   • CERVICAL BIOPSY       History reviewed. No pertinent family history.  Social History     Tobacco Use   Smoking Status Former Smoker   • Packs/day: 0.25   • Quit date: 4/10/2022   • Years since quittin.0   Smokeless Tobacco Current User   Tobacco Comment    smokes on occasion      Social History     Substance and Sexual Activity   Alcohol Use Not Currently      Current Outpatient Medications on File Prior to Visit   Medication Sig   • [DISCONTINUED] amLODIPine (NORVASC) 5 MG tablet amlodipine 5 mg tablet   • hydrOXYzine (ATARAX) 25 MG tablet hydroxyzine HCl 25 mg tablet   • [DISCONTINUED] acyclovir (ZOVIRAX) 400 MG tablet    • [DISCONTINUED] FLUoxetine (PROzac) 40 MG capsule    • [DISCONTINUED] gabapentin (NEURONTIN) 400 MG capsule    • [DISCONTINUED] hydroCHLOROthiazide (HYDRODIURIL) 25 MG tablet hydrochlorothiazide 25 mg tablet   • [DISCONTINUED] losartan (COZAAR) 100 MG tablet    • [DISCONTINUED] meloxicam (MOBIC) 7.5 MG tablet    • [DISCONTINUED] metoprolol succinate XL (TOPROL-XL) 25 MG 24 hr tablet metoprolol succinate ER 25 mg tablet,extended release 24 hr   • [DISCONTINUED] metoprolol succinate XL (TOPROL-XL) 50 MG 24 hr tablet metoprolol succinate ER 50 mg tablet,extended release 24 hr   • [DISCONTINUED] naproxen (NAPROSYN) 250 MG tablet naproxen 250 mg tablet   • [DISCONTINUED] omeprazole (priLOSEC) 40 MG capsule omeprazole 40 mg capsule,delayed release   • [DISCONTINUED] potassium chloride 10 MEQ CR tablet potassium chloride ER 10 mEq tablet,extended release   • [DISCONTINUED] ProAir  (90 Base) MCG/ACT inhaler    • [DISCONTINUED] Symbicort 160-4.5 MCG/ACT inhaler    • [DISCONTINUED] tiZANidine (ZANAFLEX) 4 MG tablet tizanidine 4 mg tablet     No current facility-administered medications on file prior to visit.     No Known Allergies    Objective   Vitals:    22 1342   BP: 146/94   Pulse: 73   Temp: 98 °F (36.7  "°C)   TempSrc: Temporal   SpO2: 97%   Weight: 95.3 kg (210 lb)   Height: 162.6 cm (64\")     Body mass index is 36.05 kg/m².    Physical Exam  Vitals and nursing note reviewed.   Constitutional:       Appearance: She is obese.   HENT:      Head: Normocephalic.      Ears:      Comments: Dry skin in both ear canals  Eyes:      Pupils: Pupils are equal, round, and reactive to light.   Cardiovascular:      Rate and Rhythm: Normal rate and regular rhythm.      Pulses: Normal pulses.      Heart sounds: Normal heart sounds.   Pulmonary:      Effort: Pulmonary effort is normal.      Breath sounds: Normal breath sounds.   Skin:     General: Skin is warm and dry.      Capillary Refill: Capillary refill takes less than 2 seconds.   Neurological:      General: No focal deficit present.      Mental Status: She is alert and oriented to person, place, and time.      Gait: Gait is intact.   Psychiatric:         Attention and Perception: Attention normal.         Mood and Affect: Mood normal.         Behavior: Behavior normal.         Assessment & Plan   Problem List Items Addressed This Visit    None     Visit Diagnoses     Mouth pain    -  Primary    Essential hypertension        Relevant Medications    amLODIPine (NORVASC) 10 MG tablet    hydroCHLOROthiazide (HYDRODIURIL) 25 MG tablet    losartan (COZAAR) 100 MG tablet    metoprolol succinate XL (TOPROL-XL) 25 MG 24 hr tablet    metoprolol succinate XL (TOPROL-XL) 50 MG 24 hr tablet    Other Relevant Orders    CBC w AUTO Differential    Comprehensive Metabolic Panel    Screening for cervical cancer        Relevant Orders    Ambulatory Referral to Obstetrics / Gynecology    Encounter for screening mammogram for malignant neoplasm of breast        Relevant Orders    Mammo Screening Digital Tomosynthesis Bilateral With CAD    High risk heterosexual behavior        Relevant Orders    HIV-1/O/2 Ag/Ab w Reflex    STI/STD PANEL URINE ([facility]) - Urine, Urine, Clean Catch    Encounter for " hepatitis C virus screening test for high risk patient        Relevant Orders    Hepatitis C Antibody    Dry skin        Relevant Medications    triamcinolone (KENALOG) 0.1 % cream    Chronic right-sided low back pain with right-sided sciatica             Labs and STD testing per pt request  Increase Amlodipine to help with BP  Increase Meloxicam  Meds refilled  Discuss with pt I could not Rx Ta due to not having any prior records on her and that she would need to earn my trust due to her hx of drug abuse  Refer for pap  Schedule mammo  Steroid cream for dry skin in ears  Cont Clindamycin for dental issue  STD testing per pt request  Schedule physical      Current Outpatient Medications:   •  acyclovir (ZOVIRAX) 400 MG tablet, Take 1 tablet by mouth 2 (Two) Times a Day., Disp: 60 tablet, Rfl: 5  •  amLODIPine (NORVASC) 10 MG tablet, Take 1 tablet by mouth Daily., Disp: 30 tablet, Rfl: 5  •  FLUoxetine (PROzac) 40 MG capsule, Take 1 capsule by mouth Daily., Disp: 30 capsule, Rfl: 5  •  hydroCHLOROthiazide (HYDRODIURIL) 25 MG tablet, Take 1 tablet by mouth Daily., Disp: 30 tablet, Rfl: 5  •  losartan (COZAAR) 100 MG tablet, Take 1 tablet by mouth Daily., Disp: 30 tablet, Rfl: 5  •  meloxicam (MOBIC) 15 MG tablet, Take 1 tablet by mouth Daily., Disp: 30 tablet, Rfl: 5  •  metoprolol succinate XL (TOPROL-XL) 25 MG 24 hr tablet, Take 1 tablet by mouth Daily., Disp: 30 tablet, Rfl: 5  •  metoprolol succinate XL (TOPROL-XL) 50 MG 24 hr tablet, Take 1 tablet by mouth Daily., Disp: 30 tablet, Rfl: 5  •  omeprazole (priLOSEC) 40 MG capsule, Take 1 capsule by mouth Daily., Disp: 30 capsule, Rfl: 5  •  potassium chloride 10 MEQ CR tablet, Take 1 tablet by mouth Daily., Disp: 30 tablet, Rfl: 5  •  ProAir  (90 Base) MCG/ACT inhaler, Inhale 2 puffs Every 4 (Four) Hours As Needed for Wheezing or Shortness of Air., Disp: 18 g, Rfl: 5  •  Symbicort 160-4.5 MCG/ACT inhaler, Inhale 2 puffs 2 (Two) Times a Day., Disp: 1 each,  Rfl: 5  •  tiZANidine (ZANAFLEX) 4 MG tablet, Take 1 tablet by mouth Every 8 (Eight) Hours As Needed for Muscle Spasms., Disp: 90 tablet, Rfl: 5  •  hydrOXYzine (ATARAX) 25 MG tablet, hydroxyzine HCl 25 mg tablet, Disp: , Rfl:   •  triamcinolone (KENALOG) 0.1 % cream, Apply small application to both ear canals, Disp: 80 g, Rfl: 2       Plan of care reviewed with the patient at the conclusion of today's visit.  Education was provided regarding diagnosis, management, and any prescribed or recommended OTC medications.  Patient verbalized understanding of and agreement with management plan.     Return in about 4 weeks (around 6/9/2022) for Annual.        DANAY Contreras

## 2022-05-13 LAB
ALBUMIN SERPL-MCNC: 4.2 G/DL (ref 3.5–5.2)
ALBUMIN/GLOB SERPL: 1.1 G/DL
ALP SERPL-CCNC: 52 U/L (ref 39–117)
ALT SERPL W P-5'-P-CCNC: 35 U/L (ref 1–33)
ANION GAP SERPL CALCULATED.3IONS-SCNC: 16.1 MMOL/L (ref 5–15)
AST SERPL-CCNC: 26 U/L (ref 1–32)
BILIRUB SERPL-MCNC: 0.2 MG/DL (ref 0–1.2)
BUN SERPL-MCNC: 11 MG/DL (ref 6–20)
BUN/CREAT SERPL: 15.1 (ref 7–25)
CALCIUM SPEC-SCNC: 9.6 MG/DL (ref 8.6–10.5)
CHLORIDE SERPL-SCNC: 100 MMOL/L (ref 98–107)
CO2 SERPL-SCNC: 22.9 MMOL/L (ref 22–29)
CREAT SERPL-MCNC: 0.73 MG/DL (ref 0.57–1)
EGFRCR SERPLBLD CKD-EPI 2021: 102.9 ML/MIN/1.73
GLOBULIN UR ELPH-MCNC: 3.7 GM/DL
GLUCOSE SERPL-MCNC: 80 MG/DL (ref 65–99)
POTASSIUM SERPL-SCNC: 3.7 MMOL/L (ref 3.5–5.2)
PROT SERPL-MCNC: 7.9 G/DL (ref 6–8.5)
SODIUM SERPL-SCNC: 139 MMOL/L (ref 136–145)

## 2022-05-16 DIAGNOSIS — D69.1 ABNORMAL PLATELETS: Primary | ICD-10-CM

## 2022-06-22 ENCOUNTER — TELEPHONE (OUTPATIENT)
Dept: INTERNAL MEDICINE | Facility: CLINIC | Age: 47
End: 2022-06-22

## 2022-06-22 NOTE — TELEPHONE ENCOUNTER
Caller: Renata Cannon    Relationship: Self    Best call back number: [phone]    Additional notes: PT CALLED STATING THAT [facility] IN [place], [place] SENT HER MEDICAL RECORDS TO THE OFFICE THREE WEEKS AGO. SHE HAS BEEN TRYING TO CONFIRM THE RECORDS WERE RECEIVED AND HASN'T BEEN ABLE TO. PLEASE FOLLOW UP WITH PT TO CONFIRM OR DENY HER RECORDS HAVE BEEN RECEIVED

## 2022-07-06 ENCOUNTER — OFFICE VISIT (OUTPATIENT)
Dept: INTERNAL MEDICINE | Facility: CLINIC | Age: 47
End: 2022-07-06

## 2022-07-06 VITALS
RESPIRATION RATE: 14 BRPM | DIASTOLIC BLOOD PRESSURE: 88 MMHG | OXYGEN SATURATION: 98 % | SYSTOLIC BLOOD PRESSURE: 134 MMHG | HEIGHT: 64 IN | HEART RATE: 66 BPM | WEIGHT: 204.4 LBS | TEMPERATURE: 97.1 F | BODY MASS INDEX: 34.89 KG/M2

## 2022-07-06 DIAGNOSIS — G89.29 CHRONIC MIDLINE LOW BACK PAIN WITH BILATERAL SCIATICA: ICD-10-CM

## 2022-07-06 DIAGNOSIS — D69.1 ABNORMAL PLATELETS: ICD-10-CM

## 2022-07-06 DIAGNOSIS — Z00.00 ROUTINE PHYSICAL EXAMINATION: Primary | ICD-10-CM

## 2022-07-06 DIAGNOSIS — M54.42 CHRONIC MIDLINE LOW BACK PAIN WITH BILATERAL SCIATICA: ICD-10-CM

## 2022-07-06 DIAGNOSIS — R29.898 BILATERAL LEG WEAKNESS: ICD-10-CM

## 2022-07-06 DIAGNOSIS — M54.41 CHRONIC MIDLINE LOW BACK PAIN WITH BILATERAL SCIATICA: ICD-10-CM

## 2022-07-06 PROCEDURE — 99396 PREV VISIT EST AGE 40-64: CPT | Performed by: NURSE PRACTITIONER

## 2022-07-06 RX ORDER — ROPINIROLE 1 MG/1
1 TABLET, FILM COATED ORAL
COMMUNITY
End: 2022-07-06 | Stop reason: SDUPTHER

## 2022-07-06 RX ORDER — OMEPRAZOLE 40 MG/1
40 CAPSULE, DELAYED RELEASE ORAL DAILY
Qty: 30 CAPSULE | Refills: 5 | Status: SHIPPED | OUTPATIENT
Start: 2022-07-06

## 2022-07-06 RX ORDER — POLYETHYLENE GLYCOL 3350 17 G
POWDER IN PACKET (EA) ORAL
COMMUNITY

## 2022-07-06 RX ORDER — HYDROCODONE BITARTRATE AND ACETAMINOPHEN 10; 325 MG/1; MG/1
TABLET ORAL
COMMUNITY
End: 2022-07-06

## 2022-07-06 RX ORDER — AMITRIPTYLINE HYDROCHLORIDE 10 MG/1
TABLET, FILM COATED ORAL
COMMUNITY

## 2022-07-06 RX ORDER — PSEUDOEPHEDRINE HCL 30 MG
TABLET ORAL
COMMUNITY
End: 2022-07-06 | Stop reason: SDUPTHER

## 2022-07-06 RX ORDER — LOSARTAN POTASSIUM 100 MG/1
TABLET ORAL
COMMUNITY
Start: 2022-01-18 | End: 2022-07-06

## 2022-07-06 RX ORDER — HYDROXYZINE PAMOATE 50 MG/1
CAPSULE ORAL
COMMUNITY
Start: 2022-06-14

## 2022-07-06 RX ORDER — OXCARBAZEPINE 150 MG/1
TABLET, FILM COATED ORAL
COMMUNITY
Start: 2022-06-14

## 2022-07-06 RX ORDER — ROPINIROLE 1 MG/1
1 TABLET, FILM COATED ORAL NIGHTLY
Qty: 30 TABLET | Refills: 5 | Status: SHIPPED | OUTPATIENT
Start: 2022-07-06

## 2022-07-06 RX ORDER — GABAPENTIN 100 MG/1
CAPSULE ORAL
COMMUNITY
End: 2022-07-06

## 2022-07-06 RX ORDER — ACYCLOVIR 400 MG/1
TABLET ORAL
COMMUNITY
Start: 2022-03-22 | End: 2022-07-06

## 2022-07-06 RX ORDER — DIPHENHYDRAMINE HCL 25 MG
TABLET ORAL
COMMUNITY
End: 2022-07-06

## 2022-07-06 RX ORDER — PSEUDOEPHEDRINE HCL 30 MG
TABLET ORAL
Qty: 30 EACH | Refills: 11 | Status: SHIPPED | OUTPATIENT
Start: 2022-07-06

## 2022-07-06 RX ORDER — OMEPRAZOLE 20 MG/1
20 CAPSULE, DELAYED RELEASE ORAL
COMMUNITY
End: 2022-07-06 | Stop reason: SDUPTHER

## 2022-07-06 RX ORDER — TRIAMCINOLONE ACETONIDE 1 MG/G
CREAM TOPICAL
COMMUNITY
End: 2022-07-06

## 2022-07-06 RX ORDER — POTASSIUM CHLORIDE 750 MG/1
TABLET, EXTENDED RELEASE ORAL
COMMUNITY
Start: 2022-06-14 | End: 2022-07-06

## 2022-07-06 RX ORDER — OFLOXACIN 3 MG/ML
SOLUTION AURICULAR (OTIC)
COMMUNITY
Start: 2022-04-18

## 2022-07-06 RX ORDER — IBUPROFEN 800 MG/1
TABLET ORAL
COMMUNITY
End: 2022-07-06

## 2022-07-06 RX ORDER — DILTIAZEM HYDROCHLORIDE 60 MG/1
TABLET, FILM COATED ORAL
COMMUNITY
Start: 2022-04-07 | End: 2022-07-06

## 2022-07-06 RX ORDER — CELECOXIB 200 MG/1
CAPSULE ORAL
COMMUNITY
Start: 2022-05-05

## 2022-07-06 RX ORDER — GABAPENTIN 300 MG/1
CAPSULE ORAL
COMMUNITY
End: 2022-07-06

## 2022-07-06 NOTE — PROGRESS NOTES
"Subjective   Chief Complaint   Patient presents with   • Follow-Up       Renata Cannon is a 46 y.o. female here today for annual exam.    Overall healthy: No  Regular exercise:  No, \"'I'm not able to\"  Diet is well balanced:  No  Vitamin Supplement:  sometimes  Alcohol intake:  No   Tobacco use:  Vapes nicotine product    Cardiovascular risk is low:  No   Menstrual cycle regular:  No  PAP:  \"few years\", had LEAP in 2017  Concern for STDs:  No  Mammogram: This has been ordered but pt didn't get the appt  Last colon screening:  Never had done  Regular dental exam:  No   Regular eye exam:  No  Immunizations up to date:  Yes, declines COVID vaccines  Wear a seatbelt regularly:  Yes  Wear sunscreen regularly when outdoors:  No    Review of Systems   Constitutional: Negative for activity change, appetite change and fatigue.   HENT: Negative for congestion.    Respiratory: Negative for cough and shortness of breath.    Cardiovascular: Negative for chest pain and leg swelling.   Gastrointestinal: Negative for abdominal pain.   Musculoskeletal: Positive for arthralgias (legs), back pain and neck pain.   Neurological: Positive for weakness (both legs). Negative for dizziness and confusion.   Psychiatric/Behavioral: Negative for behavioral problems and decreased concentration.       The following portions of the patient's history were reviewed and updated as appropriate: allergies, current medications, past family history, past medical history, past social history, past surgical history and problem list.    Past Medical History:   Diagnosis Date   • Anxiety    • Bipolar 1 disorder (HCC)    • Cocaine abuse in remission (HCC)    • COPD (chronic obstructive pulmonary disease) ([facility])    • DDD (degenerative disc disease), lumbosacral    • Enlarged heart    • Gout    • Heart murmur    • Sciatica      Past Surgical History:   Procedure Laterality Date   • CERVICAL BIOPSY       History reviewed. No pertinent family history.  Social " History     Tobacco Use   Smoking Status Former Smoker   • Packs/day: 0.25   • Quit date: 4/10/2022   • Years since quittin.2   Smokeless Tobacco Current User   Tobacco Comment    smokes on occasion      Social History     Substance and Sexual Activity   Alcohol Use Not Currently      No Known Allergies    Current Outpatient Medications on File Prior to Visit   Medication Sig   • acyclovir (ZOVIRAX) 400 MG tablet Take 1 tablet by mouth 2 (Two) Times a Day.   • ALBUTEROL SULFATE IN albuterol   • amitriptyline (ELAVIL) 10 MG tablet amitriptyline 10 mg tablet   • amLODIPine (NORVASC) 10 MG tablet Take 1 tablet by mouth Daily.   • celecoxib (CeleBREX) 200 MG capsule    • FLUoxetine (PROzac) 40 MG capsule Take 1 capsule by mouth Daily.   • hydroCHLOROthiazide (HYDRODIURIL) 25 MG tablet Take 1 tablet by mouth Daily.   • hydrOXYzine pamoate (VISTARIL) 50 MG capsule    • losartan (COZAAR) 100 MG tablet Take 1 tablet by mouth Daily.   • metoprolol succinate XL (TOPROL-XL) 25 MG 24 hr tablet Take 1 tablet by mouth Daily.   • metoprolol succinate XL (TOPROL-XL) 50 MG 24 hr tablet Take 1 tablet by mouth Daily.   • nicotine polacrilex (COMMIT) 4 MG lozenge nicotine (polacrilex) 4 mg buccal lozenge   • ofloxacin (FLOXIN) 0.3 % otic solution    • OXcarbazepine (TRILEPTAL) 150 MG tablet    • potassium chloride 10 MEQ CR tablet Take 1 tablet by mouth Daily.   • ProAir  (90 Base) MCG/ACT inhaler Inhale 2 puffs Every 4 (Four) Hours As Needed for Wheezing or Shortness of Air.   • Symbicort 160-4.5 MCG/ACT inhaler Inhale 2 puffs 2 (Two) Times a Day.   • tiZANidine (ZANAFLEX) 4 MG tablet Take 1 tablet by mouth Every 8 (Eight) Hours As Needed for Muscle Spasms.   • triamcinolone (KENALOG) 0.1 % cream Apply small application to both ear canals   • [DISCONTINUED] acyclovir (ZOVIRAX) 400 MG tablet acyclovir 400 mg tablet   • [DISCONTINUED] diphenhydrAMINE (BENADRYL) 25 MG tablet Banophen 25 mg tablet   • [DISCONTINUED] docusate  "sodium 100 MG capsule Stool Softener 100 mg capsule   • [DISCONTINUED] gabapentin (NEURONTIN) 100 MG capsule gabapentin 100 mg capsule   • [DISCONTINUED] gabapentin (NEURONTIN) 300 MG capsule gabapentin 300 mg capsule   • [DISCONTINUED] HYDROcodone-acetaminophen (NORCO)  MG per tablet hydrocodone 10 mg-acetaminophen 325 mg tablet   • [DISCONTINUED] hydrOXYzine (ATARAX) 25 MG tablet hydroxyzine HCl 25 mg tablet   • [DISCONTINUED] ibuprofen (ADVIL,MOTRIN) 800 MG tablet ibuprofen 800 mg tablet   • [DISCONTINUED] losartan (COZAAR) 100 MG tablet losartan 100 mg tablet   • [DISCONTINUED] meloxicam (MOBIC) 15 MG tablet Take 1 tablet by mouth Daily.   • [DISCONTINUED] metoprolol tartrate (LOPRESSOR) 25 MG tablet    • [DISCONTINUED] omeprazole (priLOSEC) 20 MG capsule Take 20 mg by mouth.   • [DISCONTINUED] omeprazole (priLOSEC) 40 MG capsule Take 1 capsule by mouth Daily.   • [DISCONTINUED] rOPINIRole (REQUIP) 1 MG tablet Take 1 mg by mouth.   • [DISCONTINUED] triamcinolone (KENALOG) 0.1 % cream triamcinolone acetonide 0.1 % topical cream   • [DISCONTINUED] potassium chloride (K-DUR,KLOR-CON) 10 MEQ CR tablet    • [DISCONTINUED] Symbicort 80-4.5 MCG/ACT inhaler      No current facility-administered medications on file prior to visit.       Objective   Vitals:    07/06/22 1357   BP: 134/88   Pulse: 66   Resp: 14   Temp: 97.1 °F (36.2 °C)   SpO2: 98%   Weight: 92.7 kg (204 lb 6.4 oz)   Height: 162.6 cm (64\")     Body mass index is 35.09 kg/m².    Physical Exam  Vitals and nursing note reviewed.   Constitutional:       Appearance: She is obese.   HENT:      Head: Normocephalic.      Right Ear: Tympanic membrane, ear canal and external ear normal.      Left Ear: Tympanic membrane, ear canal and external ear normal.      Mouth/Throat:      Mouth: Mucous membranes are moist.      Pharynx: Oropharynx is clear.   Eyes:      Extraocular Movements: Extraocular movements intact.      Conjunctiva/sclera: Conjunctivae normal.      " Pupils: Pupils are equal, round, and reactive to light.   Neck:      Thyroid: No thyromegaly or thyroid tenderness.      Vascular: No carotid bruit.   Cardiovascular:      Rate and Rhythm: Normal rate and regular rhythm.      Pulses: Normal pulses.           Carotid pulses are 2+ on the right side and 2+ on the left side.       Posterior tibial pulses are 2+ on the right side and 2+ on the left side.      Heart sounds: Normal heart sounds.   Pulmonary:      Effort: Pulmonary effort is normal.      Breath sounds: Normal breath sounds.   Abdominal:      General: Bowel sounds are normal. There is no distension.      Palpations: Abdomen is soft.      Tenderness: There is no abdominal tenderness.   Musculoskeletal:      Cervical back: Neck supple. Normal range of motion.        Back:       Right lower leg: No edema.      Left lower leg: No edema.      Comments: Weakness noted of RLE   Lymphadenopathy:      Cervical: No cervical adenopathy.   Skin:     General: Skin is warm and dry.      Capillary Refill: Capillary refill takes less than 2 seconds.   Neurological:      General: No focal deficit present.      Mental Status: She is alert and oriented to person, place, and time.      GCS: GCS eye subscore is 4. GCS verbal subscore is 5. GCS motor subscore is 6.      Cranial Nerves: Cranial nerves are intact.      Coordination: Coordination is intact.   Psychiatric:         Attention and Perception: Attention normal.         Mood and Affect: Mood normal.         Behavior: Behavior normal.         Thought Content: Thought content normal.         Cognition and Memory: Cognition and memory normal.         Judgment: Judgment normal.         Class 2 Severe Obesity (BMI >=35 and <=39.9). Obesity-related health conditions include the following: hypertension. Obesity is unchanged. BMI is is above average; BMI management plan is completed. We discussed portion control and increasing exercise.     Assessment & Plan     Current  Outpatient Medications:   •  acyclovir (ZOVIRAX) 400 MG tablet, Take 1 tablet by mouth 2 (Two) Times a Day., Disp: 60 tablet, Rfl: 5  •  ALBUTEROL SULFATE IN, albuterol, Disp: , Rfl:   •  amitriptyline (ELAVIL) 10 MG tablet, amitriptyline 10 mg tablet, Disp: , Rfl:   •  amLODIPine (NORVASC) 10 MG tablet, Take 1 tablet by mouth Daily., Disp: 30 tablet, Rfl: 5  •  celecoxib (CeleBREX) 200 MG capsule, , Disp: , Rfl:   •  docusate sodium 100 MG capsule, Take 1 PO daily for constipation, Disp: 30 each, Rfl: 11  •  FLUoxetine (PROzac) 40 MG capsule, Take 1 capsule by mouth Daily., Disp: 30 capsule, Rfl: 5  •  hydroCHLOROthiazide (HYDRODIURIL) 25 MG tablet, Take 1 tablet by mouth Daily., Disp: 30 tablet, Rfl: 5  •  hydrOXYzine pamoate (VISTARIL) 50 MG capsule, , Disp: , Rfl:   •  losartan (COZAAR) 100 MG tablet, Take 1 tablet by mouth Daily., Disp: 30 tablet, Rfl: 5  •  metoprolol succinate XL (TOPROL-XL) 25 MG 24 hr tablet, Take 1 tablet by mouth Daily., Disp: 30 tablet, Rfl: 5  •  metoprolol succinate XL (TOPROL-XL) 50 MG 24 hr tablet, Take 1 tablet by mouth Daily., Disp: 30 tablet, Rfl: 5  •  nicotine polacrilex (COMMIT) 4 MG lozenge, nicotine (polacrilex) 4 mg buccal lozenge, Disp: , Rfl:   •  ofloxacin (FLOXIN) 0.3 % otic solution, , Disp: , Rfl:   •  omeprazole (priLOSEC) 40 MG capsule, Take 1 capsule by mouth Daily., Disp: 30 capsule, Rfl: 5  •  OXcarbazepine (TRILEPTAL) 150 MG tablet, , Disp: , Rfl:   •  potassium chloride 10 MEQ CR tablet, Take 1 tablet by mouth Daily., Disp: 30 tablet, Rfl: 5  •  ProAir  (90 Base) MCG/ACT inhaler, Inhale 2 puffs Every 4 (Four) Hours As Needed for Wheezing or Shortness of Air., Disp: 18 g, Rfl: 5  •  rOPINIRole (REQUIP) 1 MG tablet, Take 1 tablet by mouth Every Night., Disp: 30 tablet, Rfl: 5  •  Symbicort 160-4.5 MCG/ACT inhaler, Inhale 2 puffs 2 (Two) Times a Day., Disp: 1 each, Rfl: 5  •  tiZANidine (ZANAFLEX) 4 MG tablet, Take 1 tablet by mouth Every 8 (Eight) Hours As  Needed for Muscle Spasms., Disp: 90 tablet, Rfl: 5  •  triamcinolone (KENALOG) 0.1 % cream, Apply small application to both ear canals, Disp: 80 g, Rfl: 2      Office Visit on 05/12/2022   Component Date Value Ref Range Status   • Glucose 05/12/2022 80  65 - 99 mg/dL Final   • BUN 05/12/2022 11  6 - 20 mg/dL Final   • Creatinine 05/12/2022 0.73  0.57 - 1.00 mg/dL Final   • Sodium 05/12/2022 139  136 - 145 mmol/L Final   • Potassium 05/12/2022 3.7  3.5 - 5.2 mmol/L Final   • Chloride 05/12/2022 100  98 - 107 mmol/L Final   • CO2 05/12/2022 22.9  22.0 - 29.0 mmol/L Final   • Calcium 05/12/2022 9.6  8.6 - 10.5 mg/dL Final   • Total Protein 05/12/2022 7.9  6.0 - 8.5 g/dL Final   • Albumin 05/12/2022 4.20  3.50 - 5.20 g/dL Final   • ALT (SGPT) 05/12/2022 35 (A) 1 - 33 U/L Final   • AST (SGOT) 05/12/2022 26  1 - 32 U/L Final   • Alkaline Phosphatase 05/12/2022 52  39 - 117 U/L Final   • Total Bilirubin 05/12/2022 0.2  0.0 - 1.2 mg/dL Final   • Globulin 05/12/2022 3.7  gm/dL Final   • A/G Ratio 05/12/2022 1.1  g/dL Final   • BUN/Creatinine Ratio 05/12/2022 15.1  7.0 - 25.0 Final   • Anion Gap 05/12/2022 16.1 (A) 5.0 - 15.0 mmol/L Final   • eGFR 05/12/2022 102.9  >60.0 mL/min/1.73 Final    National Kidney Foundation and American Society of Nephrology (ASN) Task Force recommended calculation based on the Chronic Kidney Disease Epidemiology Collaboration (CKD-EPI) equation refit without adjustment for race.   • Hepatitis C Ab 05/12/2022 Non-Reactive  Non-Reactive Final   • HIV-1/ HIV-2 05/12/2022 Non-Reactive  Non-Reactive Final    A non-reactive test result does not preclude the possibility of exposure to HIV or infection with HIV. An antibody response to recent exposure may take several months to reach detectable levels.   • WBC 05/12/2022 6.41  3.40 - 10.80 10*3/mm3 Final   • RBC 05/12/2022 5.66 (A) 3.77 - 5.28 10*6/mm3 Final   • Hemoglobin 05/12/2022 12.2  12.0 - 15.9 g/dL Final   • Hematocrit 05/12/2022 40.2  34.0 - 46.6  % Final   • MCV 05/12/2022 71.0 (A) 79.0 - 97.0 fL Final   • MCH 05/12/2022 21.6 (A) 26.6 - 33.0 pg Final   • MCHC 05/12/2022 30.3 (A) 31.5 - 35.7 g/dL Final   • RDW 05/12/2022 16.3 (A) 12.3 - 15.4 % Final   • RDW-SD 05/12/2022 40.5  37.0 - 54.0 fl Final   • MPV 05/12/2022 9.1  6.0 - 12.0 fL Final   • Platelets 05/12/2022 548 (A) 140 - 450 10*3/mm3 Final   • nRBC 05/12/2022 0.0  0.0 - 0.2 /100 WBC Final   • Neutrophil % 05/12/2022 39.8 (A) 42.7 - 76.0 % Final   • Lymphocyte % 05/12/2022 46.9 (A) 19.6 - 45.3 % Final   • Monocyte % 05/12/2022 7.1  5.0 - 12.0 % Final   • Eosinophil % 05/12/2022 4.1  0.3 - 6.2 % Final   • Atypical Lymphocyte % 05/12/2022 2.0  0.0 - 5.0 % Final   • Neutrophils Absolute 05/12/2022 2.55  1.70 - 7.00 10*3/mm3 Final   • Lymphocytes Absolute 05/12/2022 3.13 (A) 0.70 - 3.10 10*3/mm3 Final   • Monocytes Absolute 05/12/2022 0.46  0.10 - 0.90 10*3/mm3 Final   • Eosinophils Absolute 05/12/2022 0.26  0.00 - 0.40 10*3/mm3 Final   • Anisocytosis 05/12/2022 Slight/1+  None Seen Final   • Crenated RBC's 05/12/2022 Mod/2+  None Seen Final   • Poikilocytes 05/12/2022 Large/3+  None Seen Final   • WBC Morphology 05/12/2022 Normal  Normal Final   • Platelet Morphology 05/12/2022 Normal  Normal Final   ]    Problem List Items Addressed This Visit    None     Visit Diagnoses     Routine physical examination    -  Primary    Abnormal platelets (HCC)        Relevant Orders    Ambulatory Referral to Hematology / Oncology    Chronic midline low back pain with bilateral sciatica        Relevant Orders    Ambulatory Referral to Physical Therapy Evaluate and treat    Bilateral leg weakness        Relevant Orders    Ambulatory Referral to Physical Therapy Evaluate and treat        Refer to hematology for consistently elevated PLTs  Pt is requesting WHEELS, legs feel weak - instructed to drop form off at the office  Wants to go back to PT for chronic back pain and leg weakness, will request last MRI from Dr Sanon  Rima (neurology) in [place]  # given to schedule with gynecology to update pap  Mammo already ordered  Does not wish to have colon ca screening at this time       Counseling was given to patient for the following topics: appropriate exercise, healthy eating habits, disease prevention, importance of self breast exam and breast health and risks of smoking (including electronic cigarettes) including cancer and death.      Plan of care reviewed with the patient at the conclusion of today's visit.  Education was provided regarding diagnosis, management, and any prescribed or recommended OTC medications.  Patient verbalized understanding of and agreement with management plan.     Return in about 6 weeks (around 8/17/2022) for Back Pain and Leg Weakness.        Earl Dominique, APRN

## 2022-07-08 ENCOUNTER — TELEPHONE (OUTPATIENT)
Dept: INTERNAL MEDICINE | Facility: CLINIC | Age: 47
End: 2022-07-08

## 2022-07-08 NOTE — TELEPHONE ENCOUNTER
I sent all the refills that she requested.  I instructed her not to take Ibuprofen because she is on Celebrex (they are similar).  I will send Zyrtec

## 2022-07-08 NOTE — TELEPHONE ENCOUNTER
Caller: Renata Cannon    Relationship: Self    Best call back number: [phone]     What is the best time to reach you: ANYTIME     Who are you requesting to speak with (clinical staff, provider,  specific staff member): CLINICAL STAFF     What was the call regarding: PATIENT STATES THAT THERE IS A LIST OF MEDICATIONS THAT WERE TO BE CALLED IN TO [facility] AND SHE HAS NOT RECEIVED THEM YET. SHE SAY THE THE MEDICATIONS INCLUDE BUT ARE NOT LIMITED TO: ZYRTEC, AN INHALER, IBUPROFEN, AS SOME OTHERS.     Do you require a callback: YES

## 2022-07-08 NOTE — TELEPHONE ENCOUNTER
Please advise on prescriptions requesting by the patient. The inhaler was sent to her preferred pharmacy.

## 2022-07-21 ENCOUNTER — CONSULT (OUTPATIENT)
Dept: ONCOLOGY | Facility: CLINIC | Age: 47
End: 2022-07-21

## 2022-07-21 VITALS
DIASTOLIC BLOOD PRESSURE: 79 MMHG | WEIGHT: 207 LBS | BODY MASS INDEX: 35.34 KG/M2 | TEMPERATURE: 97.5 F | OXYGEN SATURATION: 99 % | HEART RATE: 73 BPM | HEIGHT: 64 IN | RESPIRATION RATE: 20 BRPM | SYSTOLIC BLOOD PRESSURE: 130 MMHG

## 2022-07-21 DIAGNOSIS — D50.0 IRON DEFICIENCY ANEMIA DUE TO CHRONIC BLOOD LOSS: Primary | ICD-10-CM

## 2022-07-21 PROCEDURE — 99204 OFFICE O/P NEW MOD 45 MIN: CPT | Performed by: INTERNAL MEDICINE

## 2022-07-21 RX ORDER — FERROUS SULFATE 325(65) MG
325 TABLET ORAL 2 TIMES DAILY
Qty: 60 TABLET | Refills: 3 | Status: SHIPPED | OUTPATIENT
Start: 2022-07-21

## 2022-07-21 NOTE — PROGRESS NOTES
[facility]  Office number [phone]    Fax number [phone]    New Patient Office Visit      Date: 2022     Patient Name: Renata Cannon  MRN: [id number]  : 1975    Referred by Earl Dominique NP    Chief Complaint: Elevated platelets      History of Present Illness: Renata Cannon is a pleasant 46 y.o. female who presents today for evaluation of an elevated platelet count.     She reports a longstanding history of low iron.  She was first heard about this when she was a teenager.  She reports receiving iron shots during her teen years, but stopped them because they were uncomfortable.  She has intermittently been on oral iron in the past, when doctors have told her that her iron level was low, but this is not ever been something that she took on a regular basis.    She first recalls being told about elevated platelets while incarcerated in .  She believes this was because of routine blood work that was drawn and does not recall any further follow-up.  She established care with a new primary care in May, at which point routine blood work showed an elevated platelet count prompting her referral.  She recalls having an abscessed tooth shortly prior to that and having undergone extraction.  She believes she might have been on antibiotics at the time the medication was drawn.  Her most recent preceding CBC was in 2021, and this was also when she was being treated for an abscessed tooth.    Review of her care everywhere shows multiple CBCs dating back to 2018 notable for an intermittent mild thrombocytosis in the 4-500 range.  The peak platelet count that I can see was 605.  Most recent CBC from May 2022 demonstrated normal WBC count.  Normal hemoglobin at 12.  MCV at 71.  RDW elevated at 16.  Platelets elevated at 548.  Peripheral blood smear was notable for mild anisopoikilocytosis and occasional justin cells.  Platelet morphology was reported as normal.    She  denies any hematuria, hematochezia, melena, epistaxis, or hemoptysis.  She reports that her weight fluctuates and she has mild intermittent nausea, but nothing that is persistent.  She has never had a colonoscopy.    Menarche 13.  She has longstanding history of irregular menses, can be 3 months between cycles, usually lasts 5 days with heavy flow. More recently has been coming monthly, with some breakthrough bleeding occasionally in last year.   H/o leep in 2017, overdue for pap.     No prior history of VTE, CVA/TIA.   She was told she may have had a minor heart attack by a doctor once, but was using cocaine at the time and never completed stress test or further workup.   H/o crack cocaine but no IVDU. In recovery for 5 months.  She was once told that she may have sickle cell trait but does not recall having confirmatory testing.    No known family history of bleeding disorders.  Father  of leukemia; mgm  of cancer at age 56.    Review of Systems:   I have reviewed the review of systems completed by the patient. This is negative for clinically significant symptoms except as noted below. This document has been scanned into the patient's chart.    Past Medical History:   Past Medical History:   Diagnosis Date   • Anxiety    • Bipolar 1 disorder (HCC)    • Cocaine abuse in remission (HCC)    • COPD (chronic obstructive pulmonary disease) (HCC)    • DDD (degenerative disc disease), lumbosacral    • Enlarged heart    • Gout    • Heart murmur    • Sciatica        Past Surgical History:   Past Surgical History:   Procedure Laterality Date   • CERVICAL BIOPSY         Family History: History reviewed. No pertinent family history.    Social History:   Social History     Socioeconomic History   • Marital status: Single   Tobacco Use   • Smoking status: Former Smoker     Packs/day: 0.25     Quit date: 4/10/2022     Years since quittin.2   • Smokeless tobacco: Current User   • Tobacco comment: smokes on occasion  "  Vaping Use   • Vaping Use: Every day   • Substances: Nicotine, Flavoring   Substance and Sexual Activity   • Alcohol use: Not Currently   • Drug use: Not Currently     Types: \"Crack\" cocaine, Marijuana   • Sexual activity: Defer       Medications:     Current Outpatient Medications:   •  acyclovir (ZOVIRAX) 400 MG tablet, Take 1 tablet by mouth 2 (Two) Times a Day., Disp: 60 tablet, Rfl: 5  •  ALBUTEROL SULFATE IN, albuterol, Disp: , Rfl:   •  amitriptyline (ELAVIL) 10 MG tablet, amitriptyline 10 mg tablet, Disp: , Rfl:   •  amLODIPine (NORVASC) 10 MG tablet, Take 1 tablet by mouth Daily., Disp: 30 tablet, Rfl: 5  •  celecoxib (CeleBREX) 200 MG capsule, , Disp: , Rfl:   •  docusate sodium 100 MG capsule, Take 1 PO daily for constipation, Disp: 30 each, Rfl: 11  •  FLUoxetine (PROzac) 40 MG capsule, Take 1 capsule by mouth Daily., Disp: 30 capsule, Rfl: 5  •  hydroCHLOROthiazide (HYDRODIURIL) 25 MG tablet, Take 1 tablet by mouth Daily., Disp: 30 tablet, Rfl: 5  •  hydrOXYzine pamoate (VISTARIL) 50 MG capsule, , Disp: , Rfl:   •  losartan (COZAAR) 100 MG tablet, Take 1 tablet by mouth Daily., Disp: 30 tablet, Rfl: 5  •  metoprolol succinate XL (TOPROL-XL) 25 MG 24 hr tablet, Take 1 tablet by mouth Daily., Disp: 30 tablet, Rfl: 5  •  metoprolol succinate XL (TOPROL-XL) 50 MG 24 hr tablet, Take 1 tablet by mouth Daily., Disp: 30 tablet, Rfl: 5  •  nicotine polacrilex (COMMIT) 4 MG lozenge, nicotine (polacrilex) 4 mg buccal lozenge, Disp: , Rfl:   •  ofloxacin (FLOXIN) 0.3 % otic solution, , Disp: , Rfl:   •  omeprazole (priLOSEC) 40 MG capsule, Take 1 capsule by mouth Daily., Disp: 30 capsule, Rfl: 5  •  OXcarbazepine (TRILEPTAL) 150 MG tablet, , Disp: , Rfl:   •  potassium chloride 10 MEQ CR tablet, Take 1 tablet by mouth Daily., Disp: 30 tablet, Rfl: 5  •  ProAir  (90 Base) MCG/ACT inhaler, Inhale 2 puffs Every 4 (Four) Hours As Needed for Wheezing or Shortness of Air., Disp: 18 g, Rfl: 5  •  rOPINIRole " "(REQUIP) 1 MG tablet, Take 1 tablet by mouth Every Night., Disp: 30 tablet, Rfl: 5  •  Symbicort 160-4.5 MCG/ACT inhaler, Inhale 2 puffs 2 (Two) Times a Day., Disp: 1 each, Rfl: 5  •  tiZANidine (ZANAFLEX) 4 MG tablet, Take 1 tablet by mouth Every 8 (Eight) Hours As Needed for Muscle Spasms., Disp: 90 tablet, Rfl: 5  •  triamcinolone (KENALOG) 0.1 % cream, Apply small application to both ear canals, Disp: 80 g, Rfl: 2  •  ferrous sulfate 325 (65 FE) MG tablet, Take 1 tablet by mouth 2 (Two) Times a Day., Disp: 60 tablet, Rfl: 3    Allergies:   No Known Allergies    Objective     Vital Signs:   Vitals:    07/21/22 1125   BP: 130/79   Pulse: 73   Resp: 20   Temp: 97.5 °F (36.4 °C)   SpO2: 99%   Weight: 93.9 kg (207 lb)   Height: 162.6 cm (64\")   PainSc: 0-No pain    Body mass index is 35.53 kg/m².   Pain Score    07/21/22 1125   PainSc: 0-No pain       Physical Exam:   General: No acute distress. Well appearing   HEENT: Normocephalic, atraumatic. Sclera anicteric. Masked.  Neck: supple, no adenopathy.   Cardiovascular: regular rate and rhythm,. No murmurs.   Respiratory: Normal rate. Clear to auscultation bilaterally.  Abdomen: Soft, nontender, non distended with normoactive bowel sounds. No hepatosplenomegaly  Lymph: no cervical, supraclavicular or axillary adenopathy.  Neuro: Alert and oriented x 3. No focal deficits.   Ext: Symmetric, no swelling.   Psych: Euthymic      Laboratory/Imaging Reviewed:     Component   Ref Range & Units 1 yr ago   (7/11/21) 1 yr ago   (7/8/21)   WBC   4.8 - 10.8 K/uL 8.5  9.8    RBC   4.20 - 5.40 M/uL 5.27  5.09    Hemoglobin   12.0 - 16.0 g/dL 11.8 Low   11.5 Low     Hematocrit   37.0 - 47.0 % 37.3  35.3 Low     MCV   81.0 - 99.0 fL 70.8 Low   69.4 Low     MCH   27.0 - 31.0 pg 22.4 Low   22.6 Low     MCHC   33.0 - 37.0 g/dL 31.6 Low   32.6 Low     RDW   11.5 - 14.5 % 15.1 High   15.0 High     Platelets   130 - 400 K/uL 560 High   545 High     MPV   9.4 - 12.3 fL 9.3 Low   8.9 Low   "      02/14/19 01/30/19 12/11/18 09/28/18    WBC Count 14.17 High  12.28 7.11 9.3   RBC Count 5.35 High  5.82 6.34 High  5.67 High    HGB 11.6 12.6 13.7 12.0   HCT 36.1 39.6 44.3 38.0   Platelet Count 391 High  550 605 High  561 High    MCV 68 Low  68 70 Low  67 Low    MCH 21.7 Low  21.6 21.6 Low  21.2 Low    MCHC 32.1 31.8 30.9 31.6   RDW 14.9 High  14.4 17.1 High  15.0 High    MPV 8.7 Low  8.6 8.5 Low  9.3   NRBC COUNT 0.0 0.0 0.0 0       No visits with results within 2 Week(s) from this visit.   Latest known visit with results is:   Office Visit on 05/12/2022   Component Date Value Ref Range Status   • Glucose 05/12/2022 80  65 - 99 mg/dL Final   • BUN 05/12/2022 11  6 - 20 mg/dL Final   • Creatinine 05/12/2022 0.73  0.57 - 1.00 mg/dL Final   • Sodium 05/12/2022 139  136 - 145 mmol/L Final   • Potassium 05/12/2022 3.7  3.5 - 5.2 mmol/L Final   • Chloride 05/12/2022 100  98 - 107 mmol/L Final   • CO2 05/12/2022 22.9  22.0 - 29.0 mmol/L Final   • Calcium 05/12/2022 9.6  8.6 - 10.5 mg/dL Final   • Total Protein 05/12/2022 7.9  6.0 - 8.5 g/dL Final   • Albumin 05/12/2022 4.20  3.50 - 5.20 g/dL Final   • ALT (SGPT) 05/12/2022 35 (A) 1 - 33 U/L Final   • AST (SGOT) 05/12/2022 26  1 - 32 U/L Final   • Alkaline Phosphatase 05/12/2022 52  39 - 117 U/L Final   • Total Bilirubin 05/12/2022 0.2  0.0 - 1.2 mg/dL Final   • Globulin 05/12/2022 3.7  gm/dL Final   • A/G Ratio 05/12/2022 1.1  g/dL Final   • BUN/Creatinine Ratio 05/12/2022 15.1  7.0 - 25.0 Final   • Anion Gap 05/12/2022 16.1 (A) 5.0 - 15.0 mmol/L Final   • eGFR 05/12/2022 102.9  >60.0 mL/min/1.73 Final    National Kidney Foundation and American Society of Nephrology (ASN) Task Force recommended calculation based on the Chronic Kidney Disease Epidemiology Collaboration (CKD-EPI) equation refit without adjustment for race.   • Hepatitis C Ab 05/12/2022 Non-Reactive  Non-Reactive Final   • HIV-1/ HIV-2 05/12/2022 Non-Reactive  Non-Reactive Final    A non-reactive test  result does not preclude the possibility of exposure to HIV or infection with HIV. An antibody response to recent exposure may take several months to reach detectable levels.   • WBC 05/12/2022 6.41  3.40 - 10.80 10*3/mm3 Final   • RBC 05/12/2022 5.66 (A) 3.77 - 5.28 10*6/mm3 Final   • Hemoglobin 05/12/2022 12.2  12.0 - 15.9 g/dL Final   • Hematocrit 05/12/2022 40.2  34.0 - 46.6 % Final   • MCV 05/12/2022 71.0 (A) 79.0 - 97.0 fL Final   • MCH 05/12/2022 21.6 (A) 26.6 - 33.0 pg Final   • MCHC 05/12/2022 30.3 (A) 31.5 - 35.7 g/dL Final   • RDW 05/12/2022 16.3 (A) 12.3 - 15.4 % Final   • RDW-SD 05/12/2022 40.5  37.0 - 54.0 fl Final   • MPV 05/12/2022 9.1  6.0 - 12.0 fL Final   • Platelets 05/12/2022 548 (A) 140 - 450 10*3/mm3 Final   • nRBC 05/12/2022 0.0  0.0 - 0.2 /100 WBC Final   • Neutrophil % 05/12/2022 39.8 (A) 42.7 - 76.0 % Final   • Lymphocyte % 05/12/2022 46.9 (A) 19.6 - 45.3 % Final   • Monocyte % 05/12/2022 7.1  5.0 - 12.0 % Final   • Eosinophil % 05/12/2022 4.1  0.3 - 6.2 % Final   • Atypical Lymphocyte % 05/12/2022 2.0  0.0 - 5.0 % Final   • Neutrophils Absolute 05/12/2022 2.55  1.70 - 7.00 10*3/mm3 Final   • Lymphocytes Absolute 05/12/2022 3.13 (A) 0.70 - 3.10 10*3/mm3 Final   • Monocytes Absolute 05/12/2022 0.46  0.10 - 0.90 10*3/mm3 Final   • Eosinophils Absolute 05/12/2022 0.26  0.00 - 0.40 10*3/mm3 Final   • Anisocytosis 05/12/2022 Slight/1+  None Seen Final   • Crenated RBC's 05/12/2022 Mod/2+  None Seen Final   • Poikilocytes 05/12/2022 Large/3+  None Seen Final   • WBC Morphology 05/12/2022 Normal  Normal Final   • Platelet Morphology 05/12/2022 Normal  Normal Final       No results found.    Assessment / Plan      Assessment/Plan:     1.Thrombocytosis  2.  Iron deficiency due to chronic blood loss   -I reviewed her CBCs dating back to 2018.  She has relatively few CBCs, that all appear to be performed during episodes of acute illness.  Without limitation however, she does appear to have a  persistent thrombocytosis.  We discussed the etiology of elevated platelet count could include reactive conditions such as inflammation or infection, nutritional deficiencies such as iron deficiency, postsplenectomy state or bone marrow disorders.   Her most recent CBC and peripheral smear are most consistent with mild iron deficiency, which she reports is a longstanding issue.  However she also had an abscessed tooth at the time.  We will repeat a CBC with iron studies.  If this does confirm iron deficiency, I would recommend a 3-month trial of which she can take twice daily followed by a repeat CBC.  If her CBC normalizes with iron repletion, would not recommend further hematologic work-up.  If thrombocytosis persist despite iron. repletion, will discuss further work-up at that time.  Given the microcytosis and reported previous concern for sickle cell trait, I will obtain hemoglobin electrophoresis.  -She is overdue for baseline screening colonoscopy referred her for upper endoscopy and small bowel biopsy to complete the iron deficiency work-up at the same time.    4.  Irregular bleeding  5.  History of cervical dysplasia status post LEEP  Referral to GYN for further work-up.    Follow Up:   3 months     Bre Diego MD  Hematology and Oncology     Time spent on the day of service was 45 minutes inclusive of time before, during, and after office visit on record review, medically appropriate history and physical, counseling patient, ordering tests, documenting in the medical record, and communicating with referring provider.

## 2022-08-03 ENCOUNTER — TELEPHONE (OUTPATIENT)
Dept: INTERNAL MEDICINE | Facility: CLINIC | Age: 47
End: 2022-08-03

## 2022-08-03 NOTE — TELEPHONE ENCOUNTER
Caller: Renata Cannon    Relationship to patient: Self    Best call back number: [phone]    Patient is needing: PATIENT IS PROVIDING THE FAX NUMBER [facility]  FAX: [phone]

## 2022-09-20 ENCOUNTER — TELEPHONE (OUTPATIENT)
Dept: INTERNAL MEDICINE | Facility: CLINIC | Age: 47
End: 2022-09-20

## 2024-03-14 NOTE — ED TRIAGE NOTES
Pt was at [facility] and got into a verbal altercation with staff and residents and threw her tray and became combative.  Staff told EMS pt \"does not like authority\" per to to EMS staff \"wont give me her meds\" [Joint Pain] : joint pain